# Patient Record
Sex: FEMALE | Race: OTHER | NOT HISPANIC OR LATINO | URBAN - METROPOLITAN AREA
[De-identification: names, ages, dates, MRNs, and addresses within clinical notes are randomized per-mention and may not be internally consistent; named-entity substitution may affect disease eponyms.]

---

## 2021-04-21 ENCOUNTER — EMERGENCY (EMERGENCY)
Facility: HOSPITAL | Age: 76
LOS: 1 days | Discharge: HOME | End: 2021-04-21
Attending: EMERGENCY MEDICINE
Payer: MEDICARE

## 2021-04-21 VITALS
SYSTOLIC BLOOD PRESSURE: 126 MMHG | TEMPERATURE: 98 F | OXYGEN SATURATION: 98 % | RESPIRATION RATE: 17 BRPM | HEART RATE: 85 BPM | DIASTOLIC BLOOD PRESSURE: 85 MMHG

## 2021-04-21 VITALS
SYSTOLIC BLOOD PRESSURE: 130 MMHG | HEART RATE: 103 BPM | DIASTOLIC BLOOD PRESSURE: 90 MMHG | OXYGEN SATURATION: 98 % | TEMPERATURE: 98 F | RESPIRATION RATE: 17 BRPM

## 2021-04-21 LAB
ALBUMIN SERPL ELPH-MCNC: 3.7 G/DL — SIGNIFICANT CHANGE UP (ref 3.5–5)
ALP SERPL-CCNC: 69 U/L — SIGNIFICANT CHANGE UP (ref 40–120)
ALT FLD-CCNC: 32 U/L DA — SIGNIFICANT CHANGE UP (ref 10–60)
ANION GAP SERPL CALC-SCNC: 5 MMOL/L — SIGNIFICANT CHANGE UP (ref 5–17)
APTT BLD: 28.5 SEC — SIGNIFICANT CHANGE UP (ref 27.5–35.5)
AST SERPL-CCNC: 24 U/L — SIGNIFICANT CHANGE UP (ref 10–40)
BASOPHILS # BLD AUTO: 0.06 K/UL — SIGNIFICANT CHANGE UP (ref 0–0.2)
BASOPHILS NFR BLD AUTO: 0.6 % — SIGNIFICANT CHANGE UP (ref 0–2)
BILIRUB SERPL-MCNC: 0.5 MG/DL — SIGNIFICANT CHANGE UP (ref 0.2–1.2)
BUN SERPL-MCNC: 19 MG/DL — HIGH (ref 7–18)
CALCIUM SERPL-MCNC: 9.4 MG/DL — SIGNIFICANT CHANGE UP (ref 8.4–10.5)
CHLORIDE SERPL-SCNC: 104 MMOL/L — SIGNIFICANT CHANGE UP (ref 96–108)
CK SERPL-CCNC: 52 U/L — SIGNIFICANT CHANGE UP (ref 21–215)
CO2 SERPL-SCNC: 26 MMOL/L — SIGNIFICANT CHANGE UP (ref 22–31)
CREAT SERPL-MCNC: 1.09 MG/DL — SIGNIFICANT CHANGE UP (ref 0.5–1.3)
D DIMER BLD IA.RAPID-MCNC: <150 NG/ML DDU — SIGNIFICANT CHANGE UP
EOSINOPHIL # BLD AUTO: 0.14 K/UL — SIGNIFICANT CHANGE UP (ref 0–0.5)
EOSINOPHIL NFR BLD AUTO: 1.4 % — SIGNIFICANT CHANGE UP (ref 0–6)
GLUCOSE SERPL-MCNC: 84 MG/DL — SIGNIFICANT CHANGE UP (ref 70–99)
HCT VFR BLD CALC: 38.6 % — SIGNIFICANT CHANGE UP (ref 34.5–45)
HGB BLD-MCNC: 14 G/DL — SIGNIFICANT CHANGE UP (ref 11.5–15.5)
IMM GRANULOCYTES NFR BLD AUTO: 0.4 % — SIGNIFICANT CHANGE UP (ref 0–1.5)
INR BLD: 1.06 RATIO — SIGNIFICANT CHANGE UP (ref 0.88–1.16)
LACTATE SERPL-SCNC: 2.1 MMOL/L — HIGH (ref 0.7–2)
LYMPHOCYTES # BLD AUTO: 2.63 K/UL — SIGNIFICANT CHANGE UP (ref 1–3.3)
LYMPHOCYTES # BLD AUTO: 25.5 % — SIGNIFICANT CHANGE UP (ref 13–44)
MCHC RBC-ENTMCNC: 30.5 PG — SIGNIFICANT CHANGE UP (ref 27–34)
MCHC RBC-ENTMCNC: 36.3 GM/DL — HIGH (ref 32–36)
MCV RBC AUTO: 84.1 FL — SIGNIFICANT CHANGE UP (ref 80–100)
MONOCYTES # BLD AUTO: 1.15 K/UL — HIGH (ref 0–0.9)
MONOCYTES NFR BLD AUTO: 11.2 % — SIGNIFICANT CHANGE UP (ref 2–14)
NEUTROPHILS # BLD AUTO: 6.29 K/UL — SIGNIFICANT CHANGE UP (ref 1.8–7.4)
NEUTROPHILS NFR BLD AUTO: 60.9 % — SIGNIFICANT CHANGE UP (ref 43–77)
NRBC # BLD: 0 /100 WBCS — SIGNIFICANT CHANGE UP (ref 0–0)
NT-PROBNP SERPL-SCNC: 131 PG/ML — SIGNIFICANT CHANGE UP (ref 0–450)
PLATELET # BLD AUTO: 243 K/UL — SIGNIFICANT CHANGE UP (ref 150–400)
POTASSIUM SERPL-MCNC: 3.6 MMOL/L — SIGNIFICANT CHANGE UP (ref 3.5–5.3)
POTASSIUM SERPL-SCNC: 3.6 MMOL/L — SIGNIFICANT CHANGE UP (ref 3.5–5.3)
PROT SERPL-MCNC: 7.3 G/DL — SIGNIFICANT CHANGE UP (ref 6–8.3)
PROTHROM AB SERPL-ACNC: 12.6 SEC — SIGNIFICANT CHANGE UP (ref 10.6–13.6)
RBC # BLD: 4.59 M/UL — SIGNIFICANT CHANGE UP (ref 3.8–5.2)
RBC # FLD: 13.3 % — SIGNIFICANT CHANGE UP (ref 10.3–14.5)
SARS-COV-2 RNA SPEC QL NAA+PROBE: SIGNIFICANT CHANGE UP
SODIUM SERPL-SCNC: 135 MMOL/L — SIGNIFICANT CHANGE UP (ref 135–145)
TROPONIN I SERPL-MCNC: <0.015 NG/ML — SIGNIFICANT CHANGE UP (ref 0–0.04)
WBC # BLD: 10.31 K/UL — SIGNIFICANT CHANGE UP (ref 3.8–10.5)
WBC # FLD AUTO: 10.31 K/UL — SIGNIFICANT CHANGE UP (ref 3.8–10.5)

## 2021-04-21 PROCEDURE — 87040 BLOOD CULTURE FOR BACTERIA: CPT

## 2021-04-21 PROCEDURE — 84145 PROCALCITONIN (PCT): CPT

## 2021-04-21 PROCEDURE — 85730 THROMBOPLASTIN TIME PARTIAL: CPT

## 2021-04-21 PROCEDURE — 85379 FIBRIN DEGRADATION QUANT: CPT

## 2021-04-21 PROCEDURE — 83880 ASSAY OF NATRIURETIC PEPTIDE: CPT

## 2021-04-21 PROCEDURE — 36415 COLL VENOUS BLD VENIPUNCTURE: CPT

## 2021-04-21 PROCEDURE — 93005 ELECTROCARDIOGRAM TRACING: CPT

## 2021-04-21 PROCEDURE — 82550 ASSAY OF CK (CPK): CPT

## 2021-04-21 PROCEDURE — 84484 ASSAY OF TROPONIN QUANT: CPT

## 2021-04-21 PROCEDURE — 85025 COMPLETE CBC W/AUTO DIFF WBC: CPT

## 2021-04-21 PROCEDURE — 99283 EMERGENCY DEPT VISIT LOW MDM: CPT

## 2021-04-21 PROCEDURE — 83605 ASSAY OF LACTIC ACID: CPT

## 2021-04-21 PROCEDURE — 82962 GLUCOSE BLOOD TEST: CPT

## 2021-04-21 PROCEDURE — 85610 PROTHROMBIN TIME: CPT

## 2021-04-21 PROCEDURE — 86140 C-REACTIVE PROTEIN: CPT

## 2021-04-21 PROCEDURE — 87635 SARS-COV-2 COVID-19 AMP PRB: CPT

## 2021-04-21 PROCEDURE — 82728 ASSAY OF FERRITIN: CPT

## 2021-04-21 PROCEDURE — 99285 EMERGENCY DEPT VISIT HI MDM: CPT | Mod: CS

## 2021-04-21 PROCEDURE — 80053 COMPREHEN METABOLIC PANEL: CPT

## 2021-04-21 NOTE — ED PROVIDER NOTE - OBJECTIVE STATEMENT
76 y/o F patient with unknown past medical history presents to the ED with c/o shortness of breath x4d and palpation yesterday and today. Patient denies any fever, or any other complaints. Patient states having mild chest discomfort for the past few days. Patient reports that she thinks that she was sent by Dr. Alaniz, but does not understand why. No smoking   PMD: Dr. Wilde

## 2021-04-21 NOTE — ED PROVIDER NOTE - CLINICAL SUMMARY MEDICAL DECISION MAKING FREE TEXT BOX
76 y/o F patient with palpitation, shortness of breath, and chest discomfort. Will attempt to reach Dr. Quintero, do labs, EKG, CXR and reassess.

## 2021-04-21 NOTE — ED PROVIDER NOTE - PROGRESS NOTE DETAILS
Pt doing well, VSS, ED workup with no significant abnormalities.  Lactate was initially sent with consideration for COVID, however COVID swab resulted negative.  Lactate 2.1 nonspecific, with no fever/leukocytosis, no signs of sepsis here.  Pt's daughter says that Dr. Quintero sent Pt due to slightly high WBC and mild tachycardia, however this is not evident on ED workup today.  I tried reaching Dr. Quintero multiple times and could not reach him by phone.  Pt has appointment to f/u with endocrinologist upcoming.  Advised strict return precautions and PMD f/u.

## 2021-04-21 NOTE — ED ADULT NURSE NOTE - OBJECTIVE STATEMENT
pt is a 76 y/o female with c/o  shortness of breath   and  chest pain , pt  with equal chest expansion no signs of any distress airway patent able to speak in full sentences. pt placed in hooked on a cardiac monitor for continous monitoring.

## 2021-04-21 NOTE — ED PROVIDER NOTE - NSFOLLOWUPINSTRUCTIONS_ED_ALL_ED_FT
EnglishSpanish                                                                                         Palpitations    Palpitations are feelings that your heartbeat is not normal. Your heartbeat may feel like it is:  •Uneven.      •Faster than normal.      •Fluttering.      •Skipping a beat.      This is usually not a serious problem. In some cases, you may need tests to rule out any serious problems.      Follow these instructions at home:    Pay attention to any changes in your condition. Take these actions to help manage your symptoms:    Eating and drinking   •Avoid:  •Coffee, tea, soft drinks, and energy drinks.      •Chocolate.      •Alcohol.      •Diet pills.          Lifestyle    •Try to lower your stress. These things can help you relax:  •Yoga.      •Deep breathing and meditation.      •Exercise.      •Using words and images to create positive thoughts (guided imagery).      •Using your mind to control things in your body (biofeedback).        • Do not use drugs.      •Get plenty of rest and sleep. Keep a regular bed time.        General instructions      •Take over-the-counter and prescription medicines only as told by your doctor.      • Do not use any products that contain nicotine or tobacco, such as cigarettes and e-cigarettes. If you need help quitting, ask your doctor.      •Keep all follow-up visits as told by your doctor. This is important. You may need more tests if palpitations do not go away or get worse.        Contact a doctor if:    •Your symptoms last more than 24 hours.      •Your symptoms occur more often.        Get help right away if you:    •Have chest pain.      •Feel short of breath.      •Have a very bad headache.      •Feel dizzy.      •Pass out (faint).        Summary    •Palpitations are feelings that your heartbeat is uneven or faster than normal. It may feel like your heart is fluttering or skipping a beat.      •Avoid food and drinks that may cause palpitations. These include caffeine, chocolate, and alcohol.      •Try to lower your stress. Do not smoke or use drugs.      •Get help right away if you faint or have chest pain, shortness of breath, a severe headache, or dizziness.      This information is not intended to replace advice given to you by your health care provider. Make sure you discuss any questions you have with your health care provider.      Document Revised: 01/30/2019 Document Reviewed: 01/30/2019    [employer] Patient Education © 2021 [employer]                    Log Out.      [employer]® CareNotes®     :  Health system  	                       DYSPNEA - AfterCare(R) Instructions(ER/ED)           Dyspnea    WHAT YOU NEED TO KNOW:    Dyspnea is breathing difficulty or discomfort. You may have labored, painful, or shallow breathing. You may feel breathless or short of breath. Dyspnea can occur during rest or with activity. You may have dyspnea for a short time, or it might become chronic. Dyspnea is often a symptom of a disease or condition.    DISCHARGE INSTRUCTIONS:    Return to the emergency department if:   •Your signs and symptoms are the same or worse within 24 hours of treatment.       •You have shaking chills or a fever over 102°F.       •You have new pain, pressure, or tightness in your chest.       •You have a new or worse cough or wheezing, or you cough up blood.      •You feel like you cannot get enough air.      •The skin over your ribs or on your neck sinks in when you breathe.       •You have a severe headache with vomiting and abdominal pain.       •You feel confused or dizzy.      Contact your healthcare provider or specialist if:   •You have questions or concerns about your condition or care.          Medicines:    •Medicines may be used to treat the cause of your dyspnea. Medicines may reduce swelling in your airway or decrease extra fluid from around your heart or lungs. Other medicines may be used to decrease anxiety and help you feel calm and relaxed.      •Take your medicine as directed. Contact your healthcare provider if you think your medicine is not helping or if you have side effects. Tell him or her if you are allergic to any medicine. Keep a list of the medicines, vitamins, and herbs you take. Include the amounts, and when and why you take them. Bring the list or the pill bottles to follow-up visits. Carry your medicine list with you in case of an emergency.      Manage long-term dyspnea:   •Create an action plan. You and your healthcare provider can work together to create a plan for how to handle episodes of dyspnea. The plan can include daily activities, treatment changes, and what to do if you have severe breathing problems.      •Lean forward on your elbows when you sit. This helps your lungs expand and may make it easier to breathe.      •Use pursed-lip breathing any time you feel short of breath. Breathe in through your nose and then slowly breathe out through your mouth with your lips slightly puckered. It should take you twice as long to breathe out as it did to breathe in.  Breathe in Breathe out           •Do not smoke. Nicotine and other chemicals in cigarettes and cigars can cause lung damage and make it harder to breathe. Ask your healthcare provider for information if you currently smoke and need help to quit. E-cigarettes or smokeless tobacco still contain nicotine. Talk to your healthcare provider before you use these products.       •Reach or maintain a healthy weight. Your healthcare provider can help you create a safe weight loss plan if you are overweight.      •Exercise as directed. Exercise can help your lungs work more easily. Exercise can also help you lose weight if needed. Try to get at least 30 minutes of exercise most days of the week. Your healthcare provider can help you create an exercise plan that is safe for you.      Follow up with your healthcare provider or specialist as directed: Write down your questions so you remember to ask them during your visits.       © Copyright [employer] 2021           back to top                          © Copyright [employer] 2021

## 2021-04-22 LAB
CRP SERPL-MCNC: <3 MG/L — SIGNIFICANT CHANGE UP
FERRITIN SERPL-MCNC: 422 NG/ML — HIGH (ref 15–150)
PROCALCITONIN SERPL-MCNC: 0.06 NG/ML — SIGNIFICANT CHANGE UP (ref 0.02–0.1)

## 2021-04-26 LAB
CULTURE RESULTS: SIGNIFICANT CHANGE UP
CULTURE RESULTS: SIGNIFICANT CHANGE UP
SPECIMEN SOURCE: SIGNIFICANT CHANGE UP
SPECIMEN SOURCE: SIGNIFICANT CHANGE UP

## 2021-12-16 ENCOUNTER — INPATIENT (INPATIENT)
Facility: HOSPITAL | Age: 76
LOS: 6 days | Discharge: HOME | DRG: 177 | End: 2021-12-23
Attending: HOSPITALIST | Admitting: HOSPITALIST
Payer: MEDICARE

## 2021-12-16 VITALS
OXYGEN SATURATION: 93 % | SYSTOLIC BLOOD PRESSURE: 143 MMHG | RESPIRATION RATE: 22 BRPM | DIASTOLIC BLOOD PRESSURE: 66 MMHG | HEART RATE: 88 BPM | WEIGHT: 139.99 LBS | HEIGHT: 62 IN

## 2021-12-16 LAB
ALBUMIN SERPL ELPH-MCNC: 2.2 G/DL — LOW (ref 3.5–5)
ALP SERPL-CCNC: 84 U/L — SIGNIFICANT CHANGE UP (ref 40–120)
ALT FLD-CCNC: 91 U/L DA — HIGH (ref 10–60)
ANION GAP SERPL CALC-SCNC: 8 MMOL/L — SIGNIFICANT CHANGE UP (ref 5–17)
AST SERPL-CCNC: 105 U/L — HIGH (ref 10–40)
BILIRUB SERPL-MCNC: 2 MG/DL — HIGH (ref 0.2–1.2)
BUN SERPL-MCNC: 23 MG/DL — HIGH (ref 7–18)
CALCIUM SERPL-MCNC: 8.5 MG/DL — SIGNIFICANT CHANGE UP (ref 8.4–10.5)
CHLORIDE SERPL-SCNC: 109 MMOL/L — HIGH (ref 96–108)
CO2 SERPL-SCNC: 21 MMOL/L — LOW (ref 22–31)
CREAT SERPL-MCNC: 0.91 MG/DL — SIGNIFICANT CHANGE UP (ref 0.5–1.3)
GLUCOSE SERPL-MCNC: 124 MG/DL — HIGH (ref 70–99)
POTASSIUM SERPL-MCNC: 3.8 MMOL/L — SIGNIFICANT CHANGE UP (ref 3.5–5.3)
POTASSIUM SERPL-SCNC: 3.8 MMOL/L — SIGNIFICANT CHANGE UP (ref 3.5–5.3)
PROT SERPL-MCNC: 6.9 G/DL — SIGNIFICANT CHANGE UP (ref 6–8.3)
SARS-COV-2 RNA SPEC QL NAA+PROBE: DETECTED
SODIUM SERPL-SCNC: 138 MMOL/L — SIGNIFICANT CHANGE UP (ref 135–145)
TROPONIN I, HIGH SENSITIVITY RESULT: 13.7 NG/L — SIGNIFICANT CHANGE UP

## 2021-12-16 PROCEDURE — 99285 EMERGENCY DEPT VISIT HI MDM: CPT | Mod: CS

## 2021-12-16 PROCEDURE — 71045 X-RAY EXAM CHEST 1 VIEW: CPT | Mod: 26

## 2021-12-16 PROCEDURE — 93010 ELECTROCARDIOGRAM REPORT: CPT

## 2021-12-16 RX ORDER — DEXAMETHASONE 0.5 MG/5ML
6 ELIXIR ORAL ONCE
Refills: 0 | Status: COMPLETED | OUTPATIENT
Start: 2021-12-16 | End: 2021-12-16

## 2021-12-16 RX ORDER — KETOROLAC TROMETHAMINE 30 MG/ML
30 SYRINGE (ML) INJECTION ONCE
Refills: 0 | Status: DISCONTINUED | OUTPATIENT
Start: 2021-12-16 | End: 2021-12-16

## 2021-12-16 RX ORDER — SODIUM CHLORIDE 9 MG/ML
1000 INJECTION INTRAMUSCULAR; INTRAVENOUS; SUBCUTANEOUS ONCE
Refills: 0 | Status: COMPLETED | OUTPATIENT
Start: 2021-12-16 | End: 2021-12-16

## 2021-12-16 RX ADMIN — Medication 30 MILLIGRAM(S): at 23:46

## 2021-12-16 RX ADMIN — SODIUM CHLORIDE 1000 MILLILITER(S): 9 INJECTION INTRAMUSCULAR; INTRAVENOUS; SUBCUTANEOUS at 23:45

## 2021-12-16 RX ADMIN — Medication 6 MILLIGRAM(S): at 23:46

## 2021-12-16 NOTE — ED ADULT TRIAGE NOTE - CHIEF COMPLAINT QUOTE
BIBA for sob, ems states sat was 66% on roomair, presently 93% on 8L by mask, diagnosed covid+ 1 week ago

## 2021-12-17 DIAGNOSIS — E78.5 HYPERLIPIDEMIA, UNSPECIFIED: ICD-10-CM

## 2021-12-17 DIAGNOSIS — R74.01 ELEVATION OF LEVELS OF LIVER TRANSAMINASE LEVELS: ICD-10-CM

## 2021-12-17 DIAGNOSIS — I10 ESSENTIAL (PRIMARY) HYPERTENSION: ICD-10-CM

## 2021-12-17 DIAGNOSIS — U07.1 COVID-19: ICD-10-CM

## 2021-12-17 DIAGNOSIS — Z29.9 ENCOUNTER FOR PROPHYLACTIC MEASURES, UNSPECIFIED: ICD-10-CM

## 2021-12-17 PROBLEM — Z78.9 OTHER SPECIFIED HEALTH STATUS: Chronic | Status: ACTIVE | Noted: 2021-04-21

## 2021-12-17 LAB
A1C WITH ESTIMATED AVERAGE GLUCOSE RESULT: 5.8 % — HIGH (ref 4–5.6)
ALBUMIN SERPL ELPH-MCNC: 2.1 G/DL — LOW (ref 3.5–5)
ALP SERPL-CCNC: 86 U/L — SIGNIFICANT CHANGE UP (ref 40–120)
ALT FLD-CCNC: 87 U/L DA — HIGH (ref 10–60)
ANION GAP SERPL CALC-SCNC: 7 MMOL/L — SIGNIFICANT CHANGE UP (ref 5–17)
APTT BLD: 22.8 SEC — LOW (ref 27.5–35.5)
AST SERPL-CCNC: 90 U/L — HIGH (ref 10–40)
BASOPHILS # BLD AUTO: 0.02 K/UL — SIGNIFICANT CHANGE UP (ref 0–0.2)
BASOPHILS # BLD AUTO: 0.03 K/UL — SIGNIFICANT CHANGE UP (ref 0–0.2)
BASOPHILS NFR BLD AUTO: 0.2 % — SIGNIFICANT CHANGE UP (ref 0–2)
BASOPHILS NFR BLD AUTO: 0.4 % — SIGNIFICANT CHANGE UP (ref 0–2)
BILIRUB DIRECT SERPL-MCNC: 0.7 MG/DL — HIGH (ref 0–0.3)
BILIRUB SERPL-MCNC: 1.7 MG/DL — HIGH (ref 0.2–1.2)
BUN SERPL-MCNC: 24 MG/DL — HIGH (ref 7–18)
CALCIUM SERPL-MCNC: 8.3 MG/DL — LOW (ref 8.4–10.5)
CHLORIDE SERPL-SCNC: 110 MMOL/L — HIGH (ref 96–108)
CK SERPL-CCNC: 40 U/L — SIGNIFICANT CHANGE UP (ref 21–215)
CO2 SERPL-SCNC: 23 MMOL/L — SIGNIFICANT CHANGE UP (ref 22–31)
COVID-19 SPIKE DOMAIN AB INTERP: POSITIVE
COVID-19 SPIKE DOMAIN ANTIBODY RESULT: 11.7 U/ML — HIGH
CREAT SERPL-MCNC: 0.92 MG/DL — SIGNIFICANT CHANGE UP (ref 0.5–1.3)
CRP SERPL-MCNC: 111 MG/L — HIGH
CRP SERPL-MCNC: 128 MG/L — HIGH
D DIMER BLD IA.RAPID-MCNC: 1411 NG/ML DDU — HIGH
D DIMER BLD IA.RAPID-MCNC: 1608 NG/ML DDU — HIGH
EOSINOPHIL # BLD AUTO: 0 K/UL — SIGNIFICANT CHANGE UP (ref 0–0.5)
EOSINOPHIL # BLD AUTO: 0 K/UL — SIGNIFICANT CHANGE UP (ref 0–0.5)
EOSINOPHIL NFR BLD AUTO: 0 % — SIGNIFICANT CHANGE UP (ref 0–6)
EOSINOPHIL NFR BLD AUTO: 0 % — SIGNIFICANT CHANGE UP (ref 0–6)
ESTIMATED AVERAGE GLUCOSE: 120 MG/DL — HIGH (ref 68–114)
FERRITIN SERPL-MCNC: 2340 NG/ML — HIGH (ref 15–150)
FERRITIN SERPL-MCNC: 2394 NG/ML — HIGH (ref 15–150)
GLUCOSE SERPL-MCNC: 178 MG/DL — HIGH (ref 70–99)
HCT VFR BLD CALC: 37.6 % — SIGNIFICANT CHANGE UP (ref 34.5–45)
HCT VFR BLD CALC: 38 % — SIGNIFICANT CHANGE UP (ref 34.5–45)
HGB BLD-MCNC: 12.6 G/DL — SIGNIFICANT CHANGE UP (ref 11.5–15.5)
HGB BLD-MCNC: 12.9 G/DL — SIGNIFICANT CHANGE UP (ref 11.5–15.5)
IMM GRANULOCYTES NFR BLD AUTO: 2 % — HIGH (ref 0–1.5)
IMM GRANULOCYTES NFR BLD AUTO: 3.2 % — HIGH (ref 0–1.5)
INR BLD: 1.28 RATIO — HIGH (ref 0.88–1.16)
LACTATE SERPL-SCNC: 1.6 MMOL/L — SIGNIFICANT CHANGE UP (ref 0.7–2)
LDH SERPL L TO P-CCNC: 550 U/L — HIGH (ref 120–225)
LYMPHOCYTES # BLD AUTO: 0.56 K/UL — LOW (ref 1–3.3)
LYMPHOCYTES # BLD AUTO: 0.57 K/UL — LOW (ref 1–3.3)
LYMPHOCYTES # BLD AUTO: 5.5 % — LOW (ref 13–44)
LYMPHOCYTES # BLD AUTO: 6.9 % — LOW (ref 13–44)
MAGNESIUM SERPL-MCNC: 2.5 MG/DL — SIGNIFICANT CHANGE UP (ref 1.6–2.6)
MANUAL SMEAR VERIFICATION: SIGNIFICANT CHANGE UP
MCHC RBC-ENTMCNC: 29.1 PG — SIGNIFICANT CHANGE UP (ref 27–34)
MCHC RBC-ENTMCNC: 29.2 PG — SIGNIFICANT CHANGE UP (ref 27–34)
MCHC RBC-ENTMCNC: 33.5 GM/DL — SIGNIFICANT CHANGE UP (ref 32–36)
MCHC RBC-ENTMCNC: 33.9 GM/DL — SIGNIFICANT CHANGE UP (ref 32–36)
MCV RBC AUTO: 86 FL — SIGNIFICANT CHANGE UP (ref 80–100)
MCV RBC AUTO: 86.8 FL — SIGNIFICANT CHANGE UP (ref 80–100)
METAMYELOCYTES # FLD: 1 % — HIGH (ref 0–0)
MONOCYTES # BLD AUTO: 0.42 K/UL — SIGNIFICANT CHANGE UP (ref 0–0.9)
MONOCYTES # BLD AUTO: 0.7 K/UL — SIGNIFICANT CHANGE UP (ref 0–0.9)
MONOCYTES NFR BLD AUTO: 5.2 % — SIGNIFICANT CHANGE UP (ref 2–14)
MONOCYTES NFR BLD AUTO: 6.7 % — SIGNIFICANT CHANGE UP (ref 2–14)
NEUTROPHILS # BLD AUTO: 6.86 K/UL — SIGNIFICANT CHANGE UP (ref 1.8–7.4)
NEUTROPHILS # BLD AUTO: 8.95 K/UL — HIGH (ref 1.8–7.4)
NEUTROPHILS NFR BLD AUTO: 84.3 % — HIGH (ref 43–77)
NEUTROPHILS NFR BLD AUTO: 85.6 % — HIGH (ref 43–77)
NRBC # BLD: 0 /100 WBCS — SIGNIFICANT CHANGE UP (ref 0–0)
NRBC # BLD: 0 /100 WBCS — SIGNIFICANT CHANGE UP (ref 0–0)
NRBC # BLD: 0 /100 — SIGNIFICANT CHANGE UP (ref 0–0)
NT-PROBNP SERPL-SCNC: 411 PG/ML — SIGNIFICANT CHANGE UP (ref 0–450)
OVALOCYTES BLD QL SMEAR: SLIGHT — SIGNIFICANT CHANGE UP
PHOSPHATE SERPL-MCNC: 3 MG/DL — SIGNIFICANT CHANGE UP (ref 2.5–4.5)
PLAT MORPH BLD: NORMAL — SIGNIFICANT CHANGE UP
PLATELET # BLD AUTO: 207 K/UL — SIGNIFICANT CHANGE UP (ref 150–400)
PLATELET # BLD AUTO: 216 K/UL — SIGNIFICANT CHANGE UP (ref 150–400)
POTASSIUM SERPL-MCNC: 4.2 MMOL/L — SIGNIFICANT CHANGE UP (ref 3.5–5.3)
POTASSIUM SERPL-SCNC: 4.2 MMOL/L — SIGNIFICANT CHANGE UP (ref 3.5–5.3)
PROCALCITONIN SERPL-MCNC: 0.1 NG/ML — SIGNIFICANT CHANGE UP (ref 0.02–0.1)
PROCALCITONIN SERPL-MCNC: 0.11 NG/ML — HIGH (ref 0.02–0.1)
PROT SERPL-MCNC: 6.4 G/DL — SIGNIFICANT CHANGE UP (ref 6–8.3)
PROTHROM AB SERPL-ACNC: 15.1 SEC — HIGH (ref 10.6–13.6)
RBC # BLD: 4.33 M/UL — SIGNIFICANT CHANGE UP (ref 3.8–5.2)
RBC # BLD: 4.42 M/UL — SIGNIFICANT CHANGE UP (ref 3.8–5.2)
RBC # FLD: 13.7 % — SIGNIFICANT CHANGE UP (ref 10.3–14.5)
RBC # FLD: 13.9 % — SIGNIFICANT CHANGE UP (ref 10.3–14.5)
RBC BLD AUTO: ABNORMAL
SARS-COV-2 IGG+IGM SERPL QL IA: 11.7 U/ML — HIGH
SARS-COV-2 IGG+IGM SERPL QL IA: POSITIVE
SODIUM SERPL-SCNC: 140 MMOL/L — SIGNIFICANT CHANGE UP (ref 135–145)
WBC # BLD: 10.45 K/UL — SIGNIFICANT CHANGE UP (ref 3.8–10.5)
WBC # BLD: 8.13 K/UL — SIGNIFICANT CHANGE UP (ref 3.8–10.5)
WBC # FLD AUTO: 10.45 K/UL — SIGNIFICANT CHANGE UP (ref 3.8–10.5)
WBC # FLD AUTO: 8.13 K/UL — SIGNIFICANT CHANGE UP (ref 3.8–10.5)

## 2021-12-17 PROCEDURE — 99223 1ST HOSP IP/OBS HIGH 75: CPT

## 2021-12-17 PROCEDURE — 12345: CPT | Mod: NC

## 2021-12-17 RX ORDER — ONDANSETRON 8 MG/1
4 TABLET, FILM COATED ORAL EVERY 6 HOURS
Refills: 0 | Status: DISCONTINUED | OUTPATIENT
Start: 2021-12-17 | End: 2021-12-23

## 2021-12-17 RX ORDER — ASPIRIN/CALCIUM CARB/MAGNESIUM 324 MG
81 TABLET ORAL DAILY
Refills: 0 | Status: DISCONTINUED | OUTPATIENT
Start: 2021-12-17 | End: 2021-12-23

## 2021-12-17 RX ORDER — TRAZODONE HCL 50 MG
1 TABLET ORAL
Qty: 0 | Refills: 0 | DISCHARGE

## 2021-12-17 RX ORDER — TRAZODONE HCL 50 MG
50 TABLET ORAL DAILY
Refills: 0 | Status: DISCONTINUED | OUTPATIENT
Start: 2021-12-17 | End: 2021-12-23

## 2021-12-17 RX ORDER — ENOXAPARIN SODIUM 100 MG/ML
40 INJECTION SUBCUTANEOUS DAILY
Refills: 0 | Status: DISCONTINUED | OUTPATIENT
Start: 2021-12-17 | End: 2021-12-17

## 2021-12-17 RX ORDER — ALPRAZOLAM 0.25 MG
0 TABLET ORAL
Qty: 0 | Refills: 0 | DISCHARGE

## 2021-12-17 RX ORDER — SODIUM CHLORIDE 9 MG/ML
1000 INJECTION INTRAMUSCULAR; INTRAVENOUS; SUBCUTANEOUS
Refills: 0 | Status: DISCONTINUED | OUTPATIENT
Start: 2021-12-17 | End: 2021-12-23

## 2021-12-17 RX ORDER — METOPROLOL TARTRATE 50 MG
12.5 TABLET ORAL
Refills: 0 | Status: DISCONTINUED | OUTPATIENT
Start: 2021-12-17 | End: 2021-12-23

## 2021-12-17 RX ORDER — ROSUVASTATIN CALCIUM 5 MG/1
1 TABLET ORAL
Qty: 0 | Refills: 0 | DISCHARGE

## 2021-12-17 RX ORDER — ATORVASTATIN CALCIUM 80 MG/1
80 TABLET, FILM COATED ORAL AT BEDTIME
Refills: 0 | Status: DISCONTINUED | OUTPATIENT
Start: 2021-12-17 | End: 2021-12-17

## 2021-12-17 RX ORDER — DONEPEZIL HYDROCHLORIDE 10 MG/1
5 TABLET, FILM COATED ORAL AT BEDTIME
Refills: 0 | Status: DISCONTINUED | OUTPATIENT
Start: 2021-12-17 | End: 2021-12-23

## 2021-12-17 RX ORDER — CHOLECALCIFEROL (VITAMIN D3) 125 MCG
1 CAPSULE ORAL
Qty: 0 | Refills: 0 | DISCHARGE

## 2021-12-17 RX ORDER — MEMANTINE HYDROCHLORIDE 10 MG/1
5 TABLET ORAL DAILY
Refills: 0 | Status: DISCONTINUED | OUTPATIENT
Start: 2021-12-17 | End: 2021-12-23

## 2021-12-17 RX ORDER — REMDESIVIR 5 MG/ML
200 INJECTION INTRAVENOUS EVERY 24 HOURS
Refills: 0 | Status: COMPLETED | OUTPATIENT
Start: 2021-12-17 | End: 2021-12-17

## 2021-12-17 RX ORDER — MEMANTINE HYDROCHLORIDE 10 MG/1
1 TABLET ORAL
Qty: 0 | Refills: 0 | DISCHARGE

## 2021-12-17 RX ORDER — DONEPEZIL HYDROCHLORIDE 10 MG/1
1 TABLET, FILM COATED ORAL
Qty: 0 | Refills: 0 | DISCHARGE

## 2021-12-17 RX ORDER — ACETAMINOPHEN 500 MG
650 TABLET ORAL EVERY 6 HOURS
Refills: 0 | Status: DISCONTINUED | OUTPATIENT
Start: 2021-12-17 | End: 2021-12-23

## 2021-12-17 RX ORDER — PANTOPRAZOLE SODIUM 20 MG/1
40 TABLET, DELAYED RELEASE ORAL
Refills: 0 | Status: DISCONTINUED | OUTPATIENT
Start: 2021-12-17 | End: 2021-12-23

## 2021-12-17 RX ORDER — REMDESIVIR 5 MG/ML
INJECTION INTRAVENOUS
Refills: 0 | Status: COMPLETED | OUTPATIENT
Start: 2021-12-17 | End: 2021-12-21

## 2021-12-17 RX ORDER — REMDESIVIR 5 MG/ML
100 INJECTION INTRAVENOUS EVERY 24 HOURS
Refills: 0 | Status: COMPLETED | OUTPATIENT
Start: 2021-12-18 | End: 2021-12-21

## 2021-12-17 RX ORDER — AMLODIPINE BESYLATE 2.5 MG/1
1 TABLET ORAL
Qty: 0 | Refills: 0 | DISCHARGE

## 2021-12-17 RX ORDER — OMEGA-3 ACID ETHYL ESTERS 1 G
1 CAPSULE ORAL
Qty: 0 | Refills: 0 | DISCHARGE

## 2021-12-17 RX ORDER — INFLUENZA VIRUS VACCINE 15; 15; 15; 15 UG/.5ML; UG/.5ML; UG/.5ML; UG/.5ML
0.7 SUSPENSION INTRAMUSCULAR ONCE
Refills: 0 | Status: DISCONTINUED | OUTPATIENT
Start: 2021-12-17 | End: 2021-12-23

## 2021-12-17 RX ORDER — AMLODIPINE BESYLATE 2.5 MG/1
2.5 TABLET ORAL DAILY
Refills: 0 | Status: DISCONTINUED | OUTPATIENT
Start: 2021-12-17 | End: 2021-12-23

## 2021-12-17 RX ORDER — DEXAMETHASONE 0.5 MG/5ML
6 ELIXIR ORAL DAILY
Refills: 0 | Status: DISCONTINUED | OUTPATIENT
Start: 2021-12-17 | End: 2021-12-23

## 2021-12-17 RX ORDER — ENOXAPARIN SODIUM 100 MG/ML
60 INJECTION SUBCUTANEOUS EVERY 12 HOURS
Refills: 0 | Status: DISCONTINUED | OUTPATIENT
Start: 2021-12-17 | End: 2021-12-23

## 2021-12-17 RX ADMIN — Medication 6 MILLIGRAM(S): at 05:55

## 2021-12-17 RX ADMIN — REMDESIVIR 500 MILLIGRAM(S): 5 INJECTION INTRAVENOUS at 08:00

## 2021-12-17 RX ADMIN — Medication 12.5 MILLIGRAM(S): at 18:14

## 2021-12-17 RX ADMIN — Medication 30 MILLIGRAM(S): at 03:40

## 2021-12-17 RX ADMIN — MEMANTINE HYDROCHLORIDE 5 MILLIGRAM(S): 10 TABLET ORAL at 13:45

## 2021-12-17 RX ADMIN — ENOXAPARIN SODIUM 60 MILLIGRAM(S): 100 INJECTION SUBCUTANEOUS at 18:14

## 2021-12-17 RX ADMIN — Medication 50 MILLIGRAM(S): at 13:45

## 2021-12-17 RX ADMIN — Medication 12.5 MILLIGRAM(S): at 05:54

## 2021-12-17 RX ADMIN — AMLODIPINE BESYLATE 2.5 MILLIGRAM(S): 2.5 TABLET ORAL at 05:55

## 2021-12-17 RX ADMIN — PANTOPRAZOLE SODIUM 40 MILLIGRAM(S): 20 TABLET, DELAYED RELEASE ORAL at 08:01

## 2021-12-17 RX ADMIN — DONEPEZIL HYDROCHLORIDE 5 MILLIGRAM(S): 10 TABLET, FILM COATED ORAL at 22:30

## 2021-12-17 RX ADMIN — Medication 81 MILLIGRAM(S): at 13:45

## 2021-12-17 RX ADMIN — SODIUM CHLORIDE 75 MILLILITER(S): 9 INJECTION INTRAMUSCULAR; INTRAVENOUS; SUBCUTANEOUS at 05:56

## 2021-12-17 NOTE — H&P ADULT - PROBLEM SELECTOR PLAN 4
LIVER FUNCTIONS - ( 16 Dec 2021 23:31 )  Alb: 2.2 g/dL / Pro: 6.9 g/dL / ALK PHOS: 84 U/L / ALT: 91 U/L DA / AST: 105 U/L / GGT: x         T bili 2  f/u direct and indirect bili  monitor lfts  likely due to covid  can consider RUQ if it persists.

## 2021-12-17 NOTE — H&P ADULT - ATTENDING COMMENTS
Patient is a 76 year old female with a PMH of HTN, COVID Infection     T(C): 36.8 (12-17-21 @ 00:33), Max: 36.8 (12-17-21 @ 00:33)  T(F): 98.3 (12-17-21 @ 00:33), Max: 98.3 (12-17-21 @ 00:33)  HR: 80 (12-17-21 @ 00:33) (80 - 88)  BP: 142/84 (12-17-21 @ 00:33) (142/84 - 143/66)  RR: 20 (12-17-21 @ 00:33) (20 - 22)  SpO2: 98% (12-17-21 @ 00:33) (93% - 98%)  Wt(kg): --    P/E: As above MAR    A/P:    Shortness of Breath due to Severe COVID Infection:  -COVID= Detected  -Chest film, though portable, appears to demonstrate diffuse bilateral patchy densities  -At time of examination in the ED, patient is requiring supplemental oxygen.  Therefore, patient can be categorized as Severe Disease.  -Will start patient on Lovenox  -In addition, will start patient on Dexamethasone 6mg IV Daily for 10 days or until discharge (whichever is shorter)  -Will also start patient on Albuterol MDI Q2H PRN  -Will send ESR, CRP, Procalcitonin  -Will continue to trend d-dimer, CRP, LDH, Troponin, Ferritin, CPK  -Tylenol PRN for fever  -Will continue to provide patient with any and all additional supportive care as necessary  -Will ask Infectious Disease to evaluate patient for further recommendations, such as Remdesivir    Uncontrolled Blood Glucose:  -Hemoglobin A1c with AM labs  -Blood Glucose Monitoring ACHS  -Regular Insulin Sliding Scale ACHS  -Carb Controlled, Heart Healthy, Low Sodium diet as tolerated    Hyperbilirubinemia:  -Will send Bilirubin, Total, Direct and Indirect  -Can consider obtaining Ultrasound of RUQ    Transaminitis:  -Will send Hepatitis Panel    HTN:  -Will resume patient's home medication  -Vital Signs Q4H    Hypoalbuminemia:  -Nutrition Consult    GI/DVT PPx:  -Lovenox  -Pepcid Patient is a 76 year old female with a PMH of HTN, HLD, COVID Infection dx 10 days ago who was BIBEMS due to shortness of breath.  Patient states that beginning nearly 2 weeks prior to current presentation she began to experience progressively worsening shortness of breath.  Patient states that she was diagnosed with COVID 10 days ago.    Of note, patient is unvaccinated for COVID.    T(C): 36.8 (12-17-21 @ 00:33), Max: 36.8 (12-17-21 @ 00:33)  T(F): 98.3 (12-17-21 @ 00:33), Max: 98.3 (12-17-21 @ 00:33)  HR: 80 (12-17-21 @ 00:33) (80 - 88)  BP: 142/84 (12-17-21 @ 00:33) (142/84 - 143/66)  RR: 20 (12-17-21 @ 00:33) (20 - 22)  SpO2: 98% (12-17-21 @ 00:33) (93% - 98%)  Wt(kg): --    P/E: As above MAR    A/P:    Shortness of Breath due to Severe COVID Infection:  -COVID= Detected  -Chest film, though portable, appears to demonstrate diffuse bilateral patchy densities  -At time of examination in the ED, patient is requiring supplemental oxygen.  Therefore, patient can be categorized as Severe Disease.  -Will start patient on Lovenox  -In addition, will start patient on Dexamethasone 6mg IV Daily for 10 days or until discharge (whichever is shorter)  -Will also start patient on Albuterol MDI Q2H PRN  -Will send ESR, CRP, Procalcitonin  -Will continue to trend d-dimer, CRP, LDH, Troponin, Ferritin, CPK  -Tylenol PRN for fever  -Will continue to provide patient with any and all additional supportive care as necessary  -Will ask Infectious Disease to evaluate patient for further recommendations, such as Remdesivir    Uncontrolled Blood Glucose:  -Hemoglobin A1c with AM labs  -Blood Glucose Monitoring ACHS  -Regular Insulin Sliding Scale ACHS  -Carb Controlled, Heart Healthy, Low Sodium diet as tolerated    Hyperbilirubinemia:  -Will send Bilirubin, Total, Direct and Indirect  -Can consider obtaining Ultrasound of RUQ    Transaminitis:  -Will send Hepatitis Panel    HTN:  -Will resume patient's home medication  -Vital Signs Q4H    Hypoalbuminemia:  -Nutrition Consult    GI/DVT PPx:  -Lovenox  -Pepcid

## 2021-12-17 NOTE — CHART NOTE - NSCHARTNOTEFT_GEN_A_CORE
EVENT: Paged by RN, pt required non-rebreather mask, as she desaturated to 67% on 6L NC    HPI:  77 y/o F with hx of HTN, HLD, Dementia? unvaccinated for covid, who presented to the ED with complains of weakness and hypoxia. Pt states that she developed sxs of covid 2 weeks ago and tested positive 10 days ago. Since then she has had progressively worsening fatigue, low appetite, fevers. She also endorsed on and off chest pain, and palpitations, currently denied any chest pain. She also endorses "diarrhea", described as watery bms once a day. Today she developed dizziness and nausea.  As per ED note EMS noted pt to be saturating at 60% on room air. Pt saturating at 98% on 6L.    Chest Xray showing extensive bl patchy infiltrates.  (17 Dec 2021 02:49)  Admitted to medicine for acute respiratory failure with hypoxia due to COVID 19 pneumonia   improving O2 saturation on 4L/min N-C, explained about COVID with complications and treatment course and discharge criteria     SUBJECTIVE: Pt seen and examined at bedside, appears in no acute distress. She remains on NRB mask, sPO2 96%. Noted with No IWOB or cyanosis.     OBJECTIVE:  Vital Signs Last 24 Hrs  T(C): 36.4 (17 Dec 2021 16:00), Max: 36.8 (17 Dec 2021 00:33)  T(F): 97.5 (17 Dec 2021 16:00), Max: 98.3 (17 Dec 2021 00:33)  HR: 62 (17 Dec 2021 17:55) (60 - 88)  BP: 141/67 (17 Dec 2021 17:55) (100/52 - 143/66)  BP(mean): --  RR: 19 (17 Dec 2021 17:55) (18 - 22)  SpO2: 92% (17 Dec 2021 17:55) (92% - 98%)    PHYSICAL EXAM:  Neuro: Awake and alert, oriented to person, place, and time  Cardiovascular: + S1, S2, no murmurs, rubs, or bruits  Respiratory: diminished breath sounds bilaterally, on 100% NRB mask  GI: Abdomen soft, non-tender, bowel sounds present   : Non distended;   Skin: warm and dry; no rash      LABS:                        12.6   8.13  )-----------( 207      ( 17 Dec 2021 05:41 )             37.6   CARDIAC MARKERS ( 17 Dec 2021 05:41 )  x     / x     / 40 U/L / x     / x        12-17    140  |  110<H>  |  24<H>  ----------------------------<  178<H>  4.2   |  23  |  0.92    Ca    8.3<L>      17 Dec 2021 05:41  Phos  3.0     12-17  Mg     2.5     12-17    TPro  6.4  /  Alb  2.1<L>  /  TBili  1.7<H>  /  DBili  0.7<H>  /  AST  90<H>  /  ALT  87<H>  /  AlkPhos  86  12-17        EKG:   IMAGING:    ASSESSMENT: Acute hypoxic respiratory failure with hypoxia secondary to COVID    PLAN:     -ICU consult, made aware of pt. As pt improved on NRB mask, no need for ICU at this time.  -C/w NRB mask  -Titrate O2 as tolerated, consider downgrading to optimizer pendant   -Continue current/supportive care    FOLLOW UP / RESULT:     -Monitor effectiveness of above intervention

## 2021-12-17 NOTE — DISCHARGE NOTE PROVIDER - ATTENDING DISCHARGE PHYSICAL EXAMINATION:
Patient seen and examined  No acute distress  lungs CTAB  heart RRR no m/g/r  Ext no c/c/e     Patient to take xarelto for 30 days. Dispo pending home oxygen delivery

## 2021-12-17 NOTE — PATIENT PROFILE ADULT - FALL HARM RISK - UNIVERSAL INTERVENTIONS
Bed in lowest position, wheels locked, appropriate side rails in place/Call bell, personal items and telephone in reach/Instruct patient to call for assistance before getting out of bed or chair/Non-slip footwear when patient is out of bed/[place] to call system/Physically safe environment - no spills, clutter or unnecessary equipment/Purposeful Proactive Rounding/Room/bathroom lighting operational, light cord in reach

## 2021-12-17 NOTE — DISCHARGE NOTE PROVIDER - NSDCMRMEDTOKEN_GEN_ALL_CORE_FT
amLODIPine 2.5 mg oral tablet: 1 tab(s) orally once a day  Aricept 5 mg oral tablet: 1 tab(s) orally once a day (at bedtime)  aspirin 81 mg oral tablet, chewable: 1 tab(s) orally once a day  cholecalciferol 1250 mcg (50,000 intl units) oral capsule: 1 cap(s) orally once a week  Crestor 20 mg oral tablet: 1 tab(s) orally once a day  Dexilant 60 mg oral delayed release capsule: 1 cap(s) orally once a day  Fish Oil 1000 mg oral capsule: 1 cap(s) orally once a day  icosapent 1 g oral capsule: 2 cap(s) orally 2 times a day  metoprolol succinate 50 mg oral tablet, extended release: 1 tab(s) orally once a day  Namenda 5 mg oral tablet: 1 tab(s) orally once a day  predniSONE 10 mg oral tablet: 1 tab(s) orally once a day  traZODone 50 mg oral tablet: 1 tab(s) orally once a day   acetaminophen 325 mg oral tablet: 2 tab(s) orally every 6 hours, As needed, Temp greater or equal to 38C (100.4F), Mild Pain (1 - 3), Moderate Pain (4 - 6)  aluminum hydroxide-magnesium hydroxide 200 mg-200 mg/5 mL oral suspension: 30 milliliter(s) orally every 4 hours, As needed, Dyspepsia  amLODIPine 2.5 mg oral tablet: 1 tab(s) orally once a day  Aricept 5 mg oral tablet: 1 tab(s) orally once a day (at bedtime)  cholecalciferol 1250 mcg (50,000 intl units) oral capsule: 1 cap(s) orally once a week  Crestor 20 mg oral tablet: 1 tab(s) orally once a day  dexamethasone 6 mg oral tablet: 1 tab(s) orally once a day  Dexilant 60 mg oral delayed release capsule: 1 cap(s) orally once a day  Fish Oil 1000 mg oral capsule: 1 cap(s) orally once a day  guaiFENesin 100 mg/5 mL oral liquid: 10 milliliter(s) orally every 6 hours, As needed, Cough  icosapent 1 g oral capsule: 2 cap(s) orally 2 times a day  memantine 5 mg oral tablet: 1 tab(s) orally once a day  metoprolol succinate 50 mg oral tablet, extended release: 1 tab(s) orally once a day  OLANZapine 2.5 mg oral tablet: 1 tab(s) orally once a day (at bedtime)  traZODone 50 mg oral tablet: 1 tab(s) orally once a day   acetaminophen 325 mg oral tablet: 2 tab(s) orally every 6 hours, As needed, Temp greater or equal to 38C (100.4F), Mild Pain (1 - 3), Moderate Pain (4 - 6)  aluminum hydroxide-magnesium hydroxide 200 mg-200 mg/5 mL oral suspension: 30 milliliter(s) orally every 4 hours, As needed, Dyspepsia  amLODIPine 2.5 mg oral tablet: 1 tab(s) orally once a day  Aricept 5 mg oral tablet: 1 tab(s) orally once a day (at bedtime)  cholecalciferol 1250 mcg (50,000 intl units) oral capsule: 1 cap(s) orally once a week  Crestor 20 mg oral tablet: 1 tab(s) orally once a day  dexamethasone 6 mg oral tablet: 1 tab(s) orally once a day  Dexilant 60 mg oral delayed release capsule: 1 cap(s) orally once a day  Fish Oil 1000 mg oral capsule: 1 cap(s) orally once a day  guaiFENesin 100 mg/5 mL oral liquid: 10 milliliter(s) orally every 6 hours, As needed, Cough  icosapent 1 g oral capsule: 2 cap(s) orally 2 times a day  memantine 5 mg oral tablet: 1 tab(s) orally once a day  metoprolol succinate 50 mg oral tablet, extended release: 1 tab(s) orally once a day  OLANZapine 2.5 mg oral tablet: 1 tab(s) orally once a day (at bedtime)  traZODone 50 mg oral tablet: 1 tab(s) orally once a day  Xarelto 10 mg oral tablet: 1 tab(s) orally once a day

## 2021-12-17 NOTE — H&P ADULT - HISTORY OF PRESENT ILLNESS
Pt is a 77 y/o F with hx of HTN, HLD, Dementia? unvaccinated for covid, who presented to the ED with complains of weakness and hypoxia. Pt states that she developed sxs of covid 2 weeks ago and tested positive 10 days ago. Since then she has had progressively worsening fatigue, low appetite, fevers. She also endorsed on and off chest pain, and palpitations, currently denied any chest pain. She also endorses "diarrhea", described as watery bms once a day. Today she developed dizziness and nausea.  As per ED note EMS noted pt to be saturating at 60% on room air. Pt saturating at 98% on 6L.    Chest Xray showing extensive bl patchy infiltrates.

## 2021-12-17 NOTE — DISCHARGE NOTE PROVIDER - NSDCCPCAREPLAN_GEN_ALL_CORE_FT
PRINCIPAL DISCHARGE DIAGNOSIS  Diagnosis: Acute respiratory failure due to COVID-19  Assessment and Plan of Treatment:       SECONDARY DISCHARGE DIAGNOSES  Diagnosis: Transaminitis  Assessment and Plan of Treatment:     Diagnosis: DVT prophylaxis  Assessment and Plan of Treatment:      PRINCIPAL DISCHARGE DIAGNOSIS  Diagnosis: Acute respiratory failure due to COVID-19  Assessment and Plan of Treatment: You were found to have pneumonia due to Covid 19. You required oxygen. You were treated with remdesivir and decadron and requried supplemental oxygen.  Please maintain quarentine for 14 days. Seek immediate medical attention if you develop shortness of breath. See additional instructions below.  CORONAVIRUS INSTRUCTIONS:   Based on your current clinical status and stability, it has been determined that you no longer need hospitalization and can recover while remaining in self-quarantine at home. You should follow the prevention steps below until a healthcare provider or local or state health department says you can return to your normal activities.   1. You should restrict activities outside your home, except for getting medical care.   2. Do not go to work, school, or public areas.   3. Avoid using public transportation, ride-sharing, or taxis.   4. Separate yourself from other people and animals in your home as much as possible.  When you are around other people (e.g., sharing a room or vehicle) you should wear a facemask.  5. Wash your hands often with soap and water for at least 20 seconds, especially after blowing your nose, coughing, or sneezing; going to the bathroom; and before eating or preparing food.  6. Cover your mouth and nose with a tissue when you cough or sneeze. Throw used tissues in a lined trash can. Immediately wash your hands with soap and water for at least 20 seconds  7. High touch surfaces include counters, tabletops, doorknobs, bathroom fixtures, toilets, phones, keyboards, tablets, and bedside tables.  8. Avoid sharing dishes, drinking glasses, cups, eating utensils, towels, or bedding with other people or pets in your home. After using these items, they should be washed thoroughly with soap and water.  You are strongly advised to seek prompt medical attention if your illness worsens or you develop new symptoms like fever or difficulty breathing.      SECONDARY DISCHARGE DIAGNOSES  Diagnosis: Dementia  Assessment and Plan of Treatment: Dementia care: Create a safe environment. Remove locks on bathroom doors. Cover electrical outlets, use childproof latched on cabinets. Install childproof devices to keep doors and windows secured. Childproof stoves , ovens, medications, water temperature on water heaters. Secure knives, lighters, matches, power tools, and guns. Falls precautions, free from clutter, remove throw rugs. Insure adequate lighting. Install grab rails as needed. Obtain life line, use baby monitors. Provide 24hr /7 day per week supervision Reduce confusion. Keep familiar objects and people around. Use night lights or dim lights at night. Use reminders, notes, or directions for daily activities or tasks. Keep a simple, consistent routine for waking, meals, bathing, dressing, and bedtime. Create a calm, quiet environment. Place large clocks and calendars prominently.  Display emergency numbers and home address near all telephones. Ensure a regular walking or physical activity schedule. Involve the person in daily activities as much as possible. Limit napping during the day.  Limit caffeine. Attend social events that stimulate rather than overwhelm the senses. Encourage good nutrition and hydration. Reduce distractions during meal times and snacks. Monitor chewing and swallowing ability. Continue with routine vision, hearing, dental, and medical screenings. All medications per MD only. If change in behavior or patient becomes more confused or letharic seek medical attention.    Any new problem with brain function happens. This includes problems with balance, speech, or falling a lot.   New or worsened confusion develops. New or worsened sleepiness develops. Staying awake becomes hard to do. This could indicate an infection if fever develops.      Diagnosis: HTN (hypertension)  Assessment and Plan of Treatment: Continue a Low salt diet  Activity as tolerated.  Take all medication as prescribed.  Follow up with your medical doctor for routine blood pressure monitoring at your next visit.  Notify your doctor if you have any of the following symptoms:   Dizziness, Lightheadedness, Blurry vision, Headache, Chest pain, Shortness of breath      Diagnosis: Transaminitis  Assessment and Plan of Treatment: You were found to have elevated liver enzymes, this is common with COVID 19. It is important to stay hydrated and avoid hepatotoxic medications like tylenol. For fevers take Motrin instead. Follow up with your primary care doctor in 1 week to have your liver function tests rechecked     PRINCIPAL DISCHARGE DIAGNOSIS  Diagnosis: Acute respiratory failure due to COVID-19  Assessment and Plan of Treatment: You were found to have pneumonia due to Covid 19. You required oxygen. You were treated with remdesivir and decadron and requried supplemental oxygen.  Please maintain quarentine for 14 days. Seek immediate medical attention if you develop shortness of breath. See additional instructions below.  CORONAVIRUS INSTRUCTIONS:   Based on your current clinical status and stability, it has been determined that you no longer need hospitalization and can recover while remaining in self-quarantine at home. You should follow the prevention steps below until a healthcare provider or local or state health department says you can return to your normal activities.   1. You should restrict activities outside your home, except for getting medical care.   2. Do not go to work, school, or public areas.   3. Avoid using public transportation, ride-sharing, or taxis.   4. Separate yourself from other people and animals in your home as much as possible.  When you are around other people (e.g., sharing a room or vehicle) you should wear a facemask.  5. Wash your hands often with soap and water for at least 20 seconds, especially after blowing your nose, coughing, or sneezing; going to the bathroom; and before eating or preparing food.  6. Cover your mouth and nose with a tissue when you cough or sneeze. Throw used tissues in a lined trash can. Immediately wash your hands with soap and water for at least 20 seconds  7. High touch surfaces include counters, tabletops, doorknobs, bathroom fixtures, toilets, phones, keyboards, tablets, and bedside tables.  8. Avoid sharing dishes, drinking glasses, cups, eating utensils, towels, or bedding with other people or pets in your home. After using these items, they should be washed thoroughly with soap and water.  You are strongly advised to seek prompt medical attention if your illness worsens or you develop new symptoms like fever or difficulty breathing.      SECONDARY DISCHARGE DIAGNOSES  Diagnosis: Transaminitis  Assessment and Plan of Treatment: You were found to have elevated liver enzymes, this is common with COVID 19. It is important to stay hydrated and avoid hepatotoxic medications like tylenol. For fevers take Motrin instead. Follow up with your primary care doctor in 1 week to have your liver function tests rechecked    Diagnosis: Dementia  Assessment and Plan of Treatment: Dementia care: Create a safe environment. Remove locks on bathroom doors. Cover electrical outlets, use childproof latched on cabinets. Install childproof devices to keep doors and windows secured. Childproof stoves , ovens, medications, water temperature on water heaters. Secure knives, lighters, matches, power tools, and guns. Falls precautions, free from clutter, remove throw rugs. Insure adequate lighting. Install grab rails as needed. Obtain life line, use baby monitors. Provide 24hr /7 day per week supervision Reduce confusion. Keep familiar objects and people around. Use night lights or dim lights at night. Use reminders, notes, or directions for daily activities or tasks. Keep a simple, consistent routine for waking, meals, bathing, dressing, and bedtime. Create a calm, quiet environment. Place large clocks and calendars prominently.  Display emergency numbers and home address near all telephones. Ensure a regular walking or physical activity schedule. Involve the person in daily activities as much as possible. Limit napping during the day.  Limit caffeine. Attend social events that stimulate rather than overwhelm the senses. Encourage good nutrition and hydration. Reduce distractions during meal times and snacks. Monitor chewing and swallowing ability. Continue with routine vision, hearing, dental, and medical screenings. All medications per MD only. If change in behavior or patient becomes more confused or letharic seek medical attention.    Any new problem with brain function happens. This includes problems with balance, speech, or falling a lot.   New or worsened confusion develops. New or worsened sleepiness develops. Staying awake becomes hard to do. This could indicate an infection if fever develops.      Diagnosis: HTN (hypertension)  Assessment and Plan of Treatment: Continue a Low salt diet  Activity as tolerated.  Take all medication as prescribed.  Follow up with your medical doctor for routine blood pressure monitoring at your next visit.  Notify your doctor if you have any of the following symptoms:   Dizziness, Lightheadedness, Blurry vision, Headache, Chest pain, Shortness of breath       PRINCIPAL DISCHARGE DIAGNOSIS  Diagnosis: Acute respiratory failure due to COVID-19  Assessment and Plan of Treatment: You were found to have pneumonia due to Covid 19. You required oxygen. You were treated with remdesivir and decadron and requried supplemental oxygen.  Please maintain quarentine for total of  14 days. Seek immediate medical attention if you develop shortness of breath. See additional instructions below.  CORONAVIRUS INSTRUCTIONS:   Based on your current clinical status and stability, it has been determined that you no longer need hospitalization and can recover while remaining in self-quarantine at home. You should follow the prevention steps below until a healthcare provider or local or state health department says you can return to your normal activities.   1. You should restrict activities outside your home, except for getting medical care.   2. Do not go to work, school, or public areas.   3. Avoid using public transportation, ride-sharing, or taxis.   4. Separate yourself from other people and animals in your home as much as possible.  When you are around other people (e.g., sharing a room or vehicle) you should wear a facemask.  5. Wash your hands often with soap and water for at least 20 seconds, especially after blowing your nose, coughing, or sneezing; going to the bathroom; and before eating or preparing food.  6. Cover your mouth and nose with a tissue when you cough or sneeze. Throw used tissues in a lined trash can. Immediately wash your hands with soap and water for at least 20 seconds  7. High touch surfaces include counters, tabletops, doorknobs, bathroom fixtures, toilets, phones, keyboards, tablets, and bedside tables.  8. Avoid sharing dishes, drinking glasses, cups, eating utensils, towels, or bedding with other people or pets in your home. After using these items, they should be washed thoroughly with soap and water.  You are strongly advised to seek prompt medical attention if your illness worsens or you develop new symptoms like fever or difficulty breathing.      SECONDARY DISCHARGE DIAGNOSES  Diagnosis: Transaminitis  Assessment and Plan of Treatment: You were found to have elevated liver enzymes, this is common with COVID 19. It is important to stay hydrated and avoid hepatotoxic medications like tylenol. For fevers take Motrin instead. Follow up with your primary care doctor in 1 week to have your liver function tests rechecked    Diagnosis: Dementia  Assessment and Plan of Treatment: continue medications as prescribed   Follow up with PCP within 1 week    Diagnosis: HTN (hypertension)  Assessment and Plan of Treatment: Continue a Low salt diet  Activity as tolerated.  Take all medication as prescribed.  Follow up with your medical doctor for routine blood pressure monitoring at your next visit.  Notify your doctor if you have any of the following symptoms:   Dizziness, Lightheadedness, Blurry vision, Headache, Chest pain, Shortness of breath

## 2021-12-17 NOTE — ED PROVIDER NOTE - OBJECTIVE STATEMENT
76 year old female PMh HTN coming in with SOb and COVID+. states she had symptoms onset 2 weeks ago and tested positive and symptoms have bene progressively been getting worse. states some body aches. as per EMS O2 on RA in 60s.

## 2021-12-17 NOTE — CHART NOTE - NSCHARTNOTEFT_GEN_A_CORE
EVENT: admitted to medicine for acute respiratory failure with hypoxia 2/2 COVID    SUBJECTIVE: I wanted to go home early possible     OBJECTIVE: COVID with hypoxia, required remdesivir for 5 days course as pt is unvaccinated, also requiring supplemental oxygen  with decadron     Vital Signs Last 24 Hrs   T(C): 36.6 (17 Dec 2021 07:29), Max: 36.8 (17 Dec 2021 00:33)  T(F): 97.8 (17 Dec 2021 07:29), Max: 98.3 (17 Dec 2021 00:33)  HR: 64 (17 Dec 2021 07:29) (64 - 88)  BP: 100/52 (17 Dec 2021 07:29) (100/52 - 143/66)  BP(mean): --  RR: 19 (17 Dec 2021 07:29) (19 - 22)  SpO2: 97% (17 Dec 2021 07:29) (93% - 98%)    LABS:                        12.6   8.13  )-----------( 207      ( 17 Dec 2021 05:41 )             37.6   CARDIAC MARKERS ( 17 Dec 2021 05:41 )  x     / x     / 40 U/L / x     / x        12-17    140  |  110<H>  |  24<H>  ----------------------------<  178<H>  4.2   |  23  |  0.92    Ca    8.3<L>      17 Dec 2021 05:41  Phos  3.0     12-17  Mg     2.5     12-17    TPro  6.4  /  Alb  2.1<L>  /  TBili  1.7<H>  /  DBili  0.7<H>  /  AST  90<H>  /  ALT  87<H>  /  AlkPhos  86  12-17      EKG:   IMGAGING:  < from: Xray Chest 1 View-PORTABLE IMMEDIATE (12.16.21 @ 23:40) >      ACC: [id number] EXAM:  XR CHEST PORTABLE IMMED 1V                          PROCEDURE DATE:  12/16/2021          INTERPRETATION:  Clinical history: 76-year-old female, shortness of   breath, cough and fever.    Portable view of the chest without comparison demonstrates a normal   cardiac silhouette and normal pulmonary vasculature with no pneumothorax   or acute osseous finding.    Extensive bilateral alveolar infiltrates.    IMPRESSION:  Extensive bilateral alveolar infiltrates, Covid 19 should be excluded    --- End of Report ---            DARIA BOYLEITS DO; Attending Radiologist  This document has been electronically signed. Dec 17 2021  7:49AM      ASSESSMENT:  AO x 4  CV S1S2 RRR  Lungs -diminished breaths sound, no wheezing, occasional cough     HPI:  Pt is a 77 y/o F with hx of HTN, HLD, Dementia? unvaccinated for covid, who presented to the ED with complains of weakness and hypoxia. Pt states that she developed sxs of covid 2 weeks ago and tested positive 10 days ago. Since then she has had progressively worsening fatigue, low appetite, fevers. She also endorsed on and off chest pain, and palpitations, currently denied any chest pain. She also endorses "diarrhea", described as watery bms once a day. Today she developed dizziness and nausea.  As per ED note EMS noted pt to be saturating at 60% on room air. Pt saturating at 98% on 6L.    Chest Xray showing extensive bl patchy infiltrates.  (17 Dec 2021 02:49)  admitted to medicine for acute respiratory failure with hypoxia due to COVID 19 pneumonia   improving O2 saturation on 4L/min N-C, explained about COVID with complications and treatment course and discharge criteria     PLAN:   1. acute respiratory failure with hypoxia due to COVID 19 pneumonia - Remdesivir D2, monitor LFTs , likely due to COVID pt presented with Transiminits which improving  now, f/u COVID AB, continue decadron and supplements, isolation, titrate down Oxygen as tolerated    2. Transaminitis - plan as above   3. elevated D-dimer 2/2 COVID - continue full dose of lovenox, pt might need xaralto 10mg QD x 30 days on discharge, pt and daughter Chiquita made aware   4. meds rec done with her pharm  examined and interview with [facility]  ID [id number]     discussed plan as above with Dr. Rizzo.

## 2021-12-17 NOTE — H&P ADULT - NSHPPHYSICALEXAM_GEN_ALL_CORE
GENERAL: Sweaty, NAD, NC in place, tired appearing   HEAD:  Atraumatic, Normocephalic  EYES: EOMI, PERRLA, conjunctiva and sclera clear  ENMT:  very dry mucous membranes, No lesions  NECK: Supple, normal appearance, No JVD; Normal thyroid; Trachea midline  NERVOUS SYSTEM:  Alert & Oriented X2,  Motor Strength 5/5 B/L upper and lower extremities, sensation intact  CHEST/LUNG: BL rales  HEART: Regular rate and rhythm; No murmurs, rubs, or gallops  ABDOMEN: Soft, mildly tender, Nondistended; Bowel sounds present  EXTREMITIES:  2+ Peripheral Pulses, No clubbing, cyanosis, or edema  LYMPH: No lymphadenopathy noted  SKIN: No rashes or lesions;  Good capillary refill

## 2021-12-17 NOTE — H&P ADULT - NSHPREVIEWOFSYSTEMS_GEN_ALL_CORE
CONSTITUTIONAL: + fever,+ fatigue, + decreased appetite  EYES: No eye pain, visual disturbances, or discharge  ENT:  No difficulty hearing, tinnitus, vertigo; No sinus or throat pain  NECK: No pain or stiffness  RESPIRATORY: + cough, + SOB. No wheezing, chills or hemoptysis;   CARDIOVASCULAR: + chest pain and palpitations, no passing out, dizziness, or leg swelling  GASTROINTESTINAL: + generalized abdominal discomfort, + watery stools, + nausea, No vomiting, or hematemesis; No diarrhea or constipation. No melena or hematochezia.  GENITOURINARY: No dysuria, frequency, hematuria, or incontinence  NEUROLOGICAL: No headaches, memory loss, loss of strength, numbness, or tremors  SKIN: No itching, burning, rashes, or lesions   LYMPH Nodes: No enlarged glands  ENDOCRINE: No heat or cold intolerance; No hair loss  MUSCULOSKELETAL: No joint pain or swelling; No muscle, back, No extremity pain  PSYCHIATRIC: No depression, anxiety, mood swings, or difficulty sleeping  HEME/LYMPH: No easy bruising, or bleeding gums  ALLERGY AND IMMUNOLOGIC: No hives or eczema

## 2021-12-17 NOTE — H&P ADULT - ASSESSMENT
Pt is a 77 y/o F with hx of HTN, HLD, Dementia? who presented to the ED with complains of weakness and hypoxia. Admitted for COVID 19.    PRIMARY TEAM TO CONFIRM HOME MEDS, LIMITED MED REC FROM [facility]

## 2021-12-17 NOTE — H&P ADULT - NSICDXPASTMEDICALHX_GEN_ALL_CORE_FT
PAST MEDICAL HISTORY:  HLD (hyperlipidemia)     HTN (hypertension)     No pertinent past medical history

## 2021-12-17 NOTE — DISCHARGE NOTE PROVIDER - CARE PROVIDER_API CALL
LIZY GUERRA  Internal Medicine  [address] [facility] [address] [address]  Phone: [phone]  Fax: [phone]  Follow Up Time: 2 weeks

## 2021-12-17 NOTE — PATIENT PROFILE ADULT - CAREGIVER NAME
hazel

## 2021-12-17 NOTE — H&P ADULT - PROBLEM SELECTOR PLAN 1
Pt with AHRF 2/2 covid 19  sat 60% on room air as per ems  unvaccinated, says tested positive 10 days ago  chest xray with bl patchy infiltrates  On 6L nc, 98%  will start decadron and remdesivir  monitor lfts while on remdesivir, dc if > 10x ULN  IV fluids  albuterol, tylenol, cough medicine prn  F/u covid labs  lovenox for dvt ppx

## 2021-12-17 NOTE — DISCHARGE NOTE PROVIDER - DETAILS OF MALNUTRITION DIAGNOSIS/DIAGNOSES
This patient has been assessed with a concern for Malnutrition and was treated during this hospitalization for the following Nutrition diagnosis/diagnoses:     -  12/23/2021: Moderate protein-calorie malnutrition

## 2021-12-17 NOTE — H&P ADULT - PROBLEM SELECTOR PLAN 2
pmh of htn unclear what is home meds  confirm home meds in am  will start amlodipine and low dose metoprolol for now as per sure scripts  monitor bp  adjust meds as needed

## 2021-12-17 NOTE — DISCHARGE NOTE PROVIDER - HOSPITAL COURSE
Pt is a 75 y/o F with hx of HTN, HLD, Dementia,  unvaccinated for COVID, admitted to medicine for acute respiratory failure with hypoxia due to COVID 19 per CXR. Treated with remdesivir and decadron, Oxygen saturation improving with supplemental oxygen.  Transaminitis possible due to COVID which improving also, advised to have f/u LFTs as outpt.  Also, elevated D-dimer 2/2 COVID - started on  full dose of lovenox, d/willam home on  xaralto 10mg QD x 30 days on discharge.    Incomplete 12/17    Pt is a 77 y/o F from home with hx of HTN, HLD, and Dementia who presented to the ED with complaints of weakness and hypoxia. Found COVID +, CXr confirmed significant Extensive bilateral alveolar infiltrates. Admitted to medicine for acute hypoxic respiratory failure due to COVID PNA. Requiring supplemental 02, started on remdesivir and decadron D4, Carine Christianson following. Elevated dimer on admission and started on full dose lovenox/ Hospital course complicated by worsening dementia and agitation, started on Zyprexa at bedtime. Pending PT eval for d/c planning, weaning off 02 as tolerated- Eval for need for home 02------  Eval for need for prolonger DVT PPX  -*-*-* INCOMPLETE 12/21 75 y/o F from home with hx of HTN, HLD, and Dementia who presented to the ED with complaints of weakness and hypoxia. Found COVID +, CXr confirmed significant Extensive bilateral alveolar infiltrates. Admitted to medicine for acute hypoxic respiratory failure due to COVID PNA. Requiring supplemental 02, started on remdesivir and decadron. Pulm Dr. Christianson following.   Hospital course complicated by worsening dementia and agitation, started on Zyprexa at bedtime  Supplemental O2 titrated down to 2L, pt desating with ambulation, will require home O2     Incomplete 12/22 75 y/o F from home with hx of HTN, HLD, and Dementia who presented to the ED with complaints of weakness and hypoxia. Found COVID +, CXr confirmed significant Extensive bilateral alveolar infiltrates. Admitted to medicine for acute hypoxic respiratory failure due to COVID PNA. Requiring supplemental 02, started on remdesivir and decadron. Pulm Dr. Christianson following.   Hospital course complicated by worsening dementia and agitation, started on Zyprexa at bedtime  Supplemental O2 titrated down to 2L, pt desating with ambulation, will require home O2 which was set up by CM   Clinically improving, optimized for discharge with outpt follow up   Please note that this a brief summary of hospital course please refer to daily progress notes and consult notes for full course and events

## 2021-12-18 DIAGNOSIS — U07.1 COVID-19: ICD-10-CM

## 2021-12-18 LAB
ALBUMIN SERPL ELPH-MCNC: 2.1 G/DL — LOW (ref 3.5–5)
ALP SERPL-CCNC: 72 U/L — SIGNIFICANT CHANGE UP (ref 40–120)
ALT FLD-CCNC: 59 U/L DA — SIGNIFICANT CHANGE UP (ref 10–60)
ANION GAP SERPL CALC-SCNC: 6 MMOL/L — SIGNIFICANT CHANGE UP (ref 5–17)
AST SERPL-CCNC: 44 U/L — HIGH (ref 10–40)
BILIRUB SERPL-MCNC: 1.4 MG/DL — HIGH (ref 0.2–1.2)
BUN SERPL-MCNC: 29 MG/DL — HIGH (ref 7–18)
CALCIUM SERPL-MCNC: 8.7 MG/DL — SIGNIFICANT CHANGE UP (ref 8.4–10.5)
CHLORIDE SERPL-SCNC: 112 MMOL/L — HIGH (ref 96–108)
CO2 SERPL-SCNC: 25 MMOL/L — SIGNIFICANT CHANGE UP (ref 22–31)
CREAT SERPL-MCNC: 0.88 MG/DL — SIGNIFICANT CHANGE UP (ref 0.5–1.3)
GLUCOSE SERPL-MCNC: 128 MG/DL — HIGH (ref 70–99)
HCT VFR BLD CALC: 38 % — SIGNIFICANT CHANGE UP (ref 34.5–45)
HGB BLD-MCNC: 12.9 G/DL — SIGNIFICANT CHANGE UP (ref 11.5–15.5)
MCHC RBC-ENTMCNC: 28.9 PG — SIGNIFICANT CHANGE UP (ref 27–34)
MCHC RBC-ENTMCNC: 33.9 GM/DL — SIGNIFICANT CHANGE UP (ref 32–36)
MCV RBC AUTO: 85.2 FL — SIGNIFICANT CHANGE UP (ref 80–100)
NRBC # BLD: 0 /100 WBCS — SIGNIFICANT CHANGE UP (ref 0–0)
PLATELET # BLD AUTO: 315 K/UL — SIGNIFICANT CHANGE UP (ref 150–400)
POTASSIUM SERPL-MCNC: 3.6 MMOL/L — SIGNIFICANT CHANGE UP (ref 3.5–5.3)
POTASSIUM SERPL-SCNC: 3.6 MMOL/L — SIGNIFICANT CHANGE UP (ref 3.5–5.3)
PROT SERPL-MCNC: 6.6 G/DL — SIGNIFICANT CHANGE UP (ref 6–8.3)
RBC # BLD: 4.46 M/UL — SIGNIFICANT CHANGE UP (ref 3.8–5.2)
RBC # FLD: 14.1 % — SIGNIFICANT CHANGE UP (ref 10.3–14.5)
SODIUM SERPL-SCNC: 143 MMOL/L — SIGNIFICANT CHANGE UP (ref 135–145)
WBC # BLD: 13.21 K/UL — HIGH (ref 3.8–10.5)
WBC # FLD AUTO: 13.21 K/UL — HIGH (ref 3.8–10.5)

## 2021-12-18 PROCEDURE — 99233 SBSQ HOSP IP/OBS HIGH 50: CPT

## 2021-12-18 RX ADMIN — Medication 12.5 MILLIGRAM(S): at 06:41

## 2021-12-18 RX ADMIN — ENOXAPARIN SODIUM 60 MILLIGRAM(S): 100 INJECTION SUBCUTANEOUS at 17:36

## 2021-12-18 RX ADMIN — DONEPEZIL HYDROCHLORIDE 5 MILLIGRAM(S): 10 TABLET, FILM COATED ORAL at 22:08

## 2021-12-18 RX ADMIN — REMDESIVIR 500 MILLIGRAM(S): 5 INJECTION INTRAVENOUS at 08:22

## 2021-12-18 RX ADMIN — Medication 650 MILLIGRAM(S): at 10:14

## 2021-12-18 RX ADMIN — AMLODIPINE BESYLATE 2.5 MILLIGRAM(S): 2.5 TABLET ORAL at 06:41

## 2021-12-18 RX ADMIN — PANTOPRAZOLE SODIUM 40 MILLIGRAM(S): 20 TABLET, DELAYED RELEASE ORAL at 06:41

## 2021-12-18 RX ADMIN — Medication 6 MILLIGRAM(S): at 06:41

## 2021-12-18 RX ADMIN — ENOXAPARIN SODIUM 60 MILLIGRAM(S): 100 INJECTION SUBCUTANEOUS at 06:42

## 2021-12-18 RX ADMIN — Medication 81 MILLIGRAM(S): at 12:37

## 2021-12-18 RX ADMIN — Medication 12.5 MILLIGRAM(S): at 17:36

## 2021-12-18 RX ADMIN — MEMANTINE HYDROCHLORIDE 5 MILLIGRAM(S): 10 TABLET ORAL at 12:37

## 2021-12-18 RX ADMIN — Medication 50 MILLIGRAM(S): at 12:37

## 2021-12-18 RX ADMIN — Medication 650 MILLIGRAM(S): at 10:35

## 2021-12-18 NOTE — PROGRESS NOTE ADULT - PROBLEM SELECTOR PLAN 3
Controlled,   C/w amlodipine and low dose metoprolol for now as per sure scripts  monitor bp  adjust meds as needed

## 2021-12-18 NOTE — PROGRESS NOTE ADULT - PROBLEM SELECTOR PLAN 1
P/w o2 sat 60%, unvaccinated, tested positive 10 days ago  chest xray with bl patchy infiltrates  episode of hypoxia in 60's now requiring 6L pendant  elevated d dimer  C/w decadron and remdesivir  C/w monitor lfts while on remdesivir,   C/w albuterol, tylenol, cough medicine prn  F/u covid labs  C/w full dose Lovenox

## 2021-12-18 NOTE — PROGRESS NOTE ADULT - SUBJECTIVE AND OBJECTIVE BOX
Patient is a 76y old  Female who presents with a chief complaint of COVID (18 Dec 2021 11:38)    INTERVAL HPI/OVERNIGHT EVENTS:     REVIEW OF SYSTEMS:  CONSTITUTIONAL: No fever, chills  ENMT:  No difficulty hearing, no change in vision  NECK: No pain or stiffness  RESPIRATORY: No cough, SOB  CARDIOVASCULAR: No chest pain, palpitations  GASTROINTESTINAL: No abdominal pain. No nausea, vomiting, or diarrhea  GENITOURINARY: No dysuria  NEUROLOGICAL: No HA  SKIN: No itching, burning, rashes, or lesions   LYMPH NODES: No enlarged glands  ENDOCRINE: No heat or cold intolerance; No hair loss  MUSCULOSKELETAL: No joint pain or swelling; No muscle, back, or extremity pain  PSYCHIATRIC: No depression, anxiety  HEME/LYMPH: No easy bruising, or bleeding gums    T(C): 36.1 (12-18-21 @ 13:27), Max: 36.4 (12-17-21 @ 16:00)  HR: 57 (12-18-21 @ 13:27) (55 - 64)  BP: 114/58 (12-18-21 @ 13:27) (114/58 - 141/67)  RR: 19 (12-18-21 @ 13:27) (18 - 22)  SpO2: 100% (12-18-21 @ 13:27) (90% - 100%)  Wt(kg): --Vital Signs Last 24 Hrs  T(C): 36.1 (18 Dec 2021 13:27), Max: 36.4 (17 Dec 2021 16:00)  T(F): 97 (18 Dec 2021 13:27), Max: 97.5 (17 Dec 2021 16:00)  HR: 57 (18 Dec 2021 13:27) (55 - 64)  BP: 114/58 (18 Dec 2021 13:27) (114/58 - 141/67)  BP(mean): --  RR: 19 (18 Dec 2021 13:27) (18 - 22)  SpO2: 100% (18 Dec 2021 13:27) (90% - 100%)    MEDICATIONS  (STANDING):  amLODIPine   Tablet 2.5 milliGRAM(s) Oral daily  aspirin  chewable 81 milliGRAM(s) Oral daily  dexAMETHasone  Injectable 6 milliGRAM(s) IV Push daily  donepezil 5 milliGRAM(s) Oral at bedtime  enoxaparin Injectable 60 milliGRAM(s) SubCutaneous every 12 hours  influenza  Vaccine (HIGH DOSE) 0.7 milliLiter(s) IntraMuscular once  memantine 5 milliGRAM(s) Oral daily  metoprolol tartrate 12.5 milliGRAM(s) Oral two times a day  pantoprazole    Tablet 40 milliGRAM(s) Oral before breakfast  remdesivir  IVPB   IV Intermittent   remdesivir  IVPB 100 milliGRAM(s) IV Intermittent every 24 hours  sodium chloride 0.9%. 1000 milliLiter(s) (75 mL/Hr) IV Continuous <Continuous>  traZODone 50 milliGRAM(s) Oral daily    MEDICATIONS  (PRN):  acetaminophen     Tablet .. 650 milliGRAM(s) Oral every 6 hours PRN Temp greater or equal to 38C (100.4F), Mild Pain (1 - 3), Moderate Pain (4 - 6)  aluminum hydroxide/magnesium hydroxide/simethicone Suspension 30 milliLiter(s) Oral every 4 hours PRN Dyspepsia  benzonatate 100 milliGRAM(s) Oral three times a day PRN Cough  ondansetron Injectable 4 milliGRAM(s) IV Push every 6 hours PRN Nausea and/or Vomiting      PHYSICAL EXAM:  GENERAL: NAD  EYES: clear conjunctiva; EOMI  ENMT: Moist mucous membranes  NECK: Supple, No JVD, Normal thyroid  CHEST/LUNG: Clear auscultation bilaterally; No rales, rhonchi, wheezing, or rubs  HEART: S1, S2, Regular rate and rhythm  ABDOMEN: Soft, Nontender, Nondistended; Bowel sounds present  NEURO: Alert & Oriented X3  EXTREMITIES: No LE edema, no calf tenderness  LYMPH: No lymphadenopathy noted  SKIN: No rashes or lesions    Consultant(s) Notes Reviewed:  [x ] YES  [ ] NO  Care Discussed with Consultants/Other Providers [ x] YES  [ ] NO    LABS:                        12.9   13.21 )-----------( 315      ( 18 Dec 2021 06:23 )             38.0     12-18    143  |  112<H>  |  29<H>  ----------------------------<  128<H>  3.6   |  25  |  0.88    Ca    8.7      18 Dec 2021 06:23  Phos  3.0     12-17  Mg     2.5     12-17    TPro  6.6  /  Alb  2.1<L>  /  TBili  1.4<H>  /  DBili  x   /  AST  44<H>  /  ALT  59  /  AlkPhos  72  12-18    PT/INR - ( 17 Dec 2021 05:41 )   PT: 15.1 sec;   INR: 1.28 ratio         PTT - ( 17 Dec 2021 05:41 )  PTT:22.8 sec  CAPILLARY BLOOD GLUCOSE    RADIOLOGY & ADDITIONAL TESTS:    Imaging Personally Reviewed:  [x ] YES  [ ] NO  < from: Xray Chest 1 View-PORTABLE IMMEDIATE (12.16.21 @ 23:40) >    ACC: [id number] EXAM:  XR CHEST PORTABLE IMMED 1V                          PROCEDURE DATE:  12/16/2021          INTERPRETATION:  Clinical history: 76-year-old female, shortness of   breath, cough and fever.    Portable view of the chest without comparison demonstrates a normal   cardiac silhouette and normal pulmonary vasculature with no pneumothorax   or acute osseous finding.    Extensive bilateral alveolar infiltrates.    IMPRESSION:  Extensive bilateral alveolar infiltrates, Covid 19 should be excluded    --- End of Report ---            DARIA SYK DO; Attending Radiologist  This document has been electronically signed. Dec 17 2021  7:49AM    < end of copied text >     Patient is a 76y old  Female who presents with a chief complaint of COVID (18 Dec 2021 11:38)    INTERVAL HPI/OVERNIGHT EVENTS: episode of hypoxia overnight    REVIEW OF SYSTEMS:  CONSTITUTIONAL: No fever, chills  ENMT:  No difficulty hearing, no change in vision  NECK: No pain or stiffness  RESPIRATORY: No cough, SOB  CARDIOVASCULAR: No chest pain, palpitations  GASTROINTESTINAL: No abdominal pain. No nausea, vomiting, or diarrhea  GENITOURINARY: No dysuria  NEUROLOGICAL: No HA  SKIN: No itching, burning, rashes, or lesions   LYMPH NODES: No enlarged glands  ENDOCRINE: No heat or cold intolerance; No hair loss  MUSCULOSKELETAL: No joint pain or swelling; No muscle, back, or extremity pain  PSYCHIATRIC: No depression, anxiety  HEME/LYMPH: No easy bruising, or bleeding gums    T(C): 36.1 (12-18-21 @ 13:27), Max: 36.4 (12-17-21 @ 16:00)  HR: 57 (12-18-21 @ 13:27) (55 - 64)  BP: 114/58 (12-18-21 @ 13:27) (114/58 - 141/67)  RR: 19 (12-18-21 @ 13:27) (18 - 22)  SpO2: 100% (12-18-21 @ 13:27) (90% - 100%)  Wt(kg): --Vital Signs Last 24 Hrs  T(C): 36.1 (18 Dec 2021 13:27), Max: 36.4 (17 Dec 2021 16:00)  T(F): 97 (18 Dec 2021 13:27), Max: 97.5 (17 Dec 2021 16:00)  HR: 57 (18 Dec 2021 13:27) (55 - 64)  BP: 114/58 (18 Dec 2021 13:27) (114/58 - 141/67)  BP(mean): --  RR: 19 (18 Dec 2021 13:27) (18 - 22)  SpO2: 100% (18 Dec 2021 13:27) (90% - 100%)    MEDICATIONS  (STANDING):  amLODIPine   Tablet 2.5 milliGRAM(s) Oral daily  aspirin  chewable 81 milliGRAM(s) Oral daily  dexAMETHasone  Injectable 6 milliGRAM(s) IV Push daily  donepezil 5 milliGRAM(s) Oral at bedtime  enoxaparin Injectable 60 milliGRAM(s) SubCutaneous every 12 hours  influenza  Vaccine (HIGH DOSE) 0.7 milliLiter(s) IntraMuscular once  memantine 5 milliGRAM(s) Oral daily  metoprolol tartrate 12.5 milliGRAM(s) Oral two times a day  pantoprazole    Tablet 40 milliGRAM(s) Oral before breakfast  remdesivir  IVPB   IV Intermittent   remdesivir  IVPB 100 milliGRAM(s) IV Intermittent every 24 hours  sodium chloride 0.9%. 1000 milliLiter(s) (75 mL/Hr) IV Continuous <Continuous>  traZODone 50 milliGRAM(s) Oral daily    MEDICATIONS  (PRN):  acetaminophen     Tablet .. 650 milliGRAM(s) Oral every 6 hours PRN Temp greater or equal to 38C (100.4F), Mild Pain (1 - 3), Moderate Pain (4 - 6)  aluminum hydroxide/magnesium hydroxide/simethicone Suspension 30 milliLiter(s) Oral every 4 hours PRN Dyspepsia  benzonatate 100 milliGRAM(s) Oral three times a day PRN Cough  ondansetron Injectable 4 milliGRAM(s) IV Push every 6 hours PRN Nausea and/or Vomiting      PHYSICAL EXAM:  GENERAL: NAD  EYES: clear conjunctiva; EOMI  ENMT: Moist mucous membranes  NECK: Supple, No JVD, Normal thyroid  CHEST/LUNG: Clear auscultation bilaterally; No rales, rhonchi, wheezing, or rubs  HEART: S1, S2, Regular rate and rhythm  ABDOMEN: Soft, Nontender, Nondistended; Bowel sounds present  NEURO: Alert & Oriented X3  EXTREMITIES: No LE edema, no calf tenderness  LYMPH: No lymphadenopathy noted  SKIN: No rashes or lesions    Consultant(s) Notes Reviewed:  [x ] YES  [ ] NO  Care Discussed with Consultants/Other Providers [ x] YES  [ ] NO    LABS:                        12.9   13.21 )-----------( 315      ( 18 Dec 2021 06:23 )             38.0     12-18    143  |  112<H>  |  29<H>  ----------------------------<  128<H>  3.6   |  25  |  0.88    Ca    8.7      18 Dec 2021 06:23  Phos  3.0     12-17  Mg     2.5     12-17    TPro  6.6  /  Alb  2.1<L>  /  TBili  1.4<H>  /  DBili  x   /  AST  44<H>  /  ALT  59  /  AlkPhos  72  12-18    PT/INR - ( 17 Dec 2021 05:41 )   PT: 15.1 sec;   INR: 1.28 ratio         PTT - ( 17 Dec 2021 05:41 )  PTT:22.8 sec  CAPILLARY BLOOD GLUCOSE    RADIOLOGY & ADDITIONAL TESTS:    Imaging Personally Reviewed:  [x ] YES  [ ] NO  < from: Xray Chest 1 View-PORTABLE IMMEDIATE (12.16.21 @ 23:40) >    ACC: [id number] EXAM:  XR CHEST PORTABLE IMMED 1V                          PROCEDURE DATE:  12/16/2021          INTERPRETATION:  Clinical history: 76-year-old female, shortness of   breath, cough and fever.    Portable view of the chest without comparison demonstrates a normal   cardiac silhouette and normal pulmonary vasculature with no pneumothorax   or acute osseous finding.    Extensive bilateral alveolar infiltrates.    IMPRESSION:  Extensive bilateral alveolar infiltrates, Covid 19 should be excluded    --- End of Report ---            DARIA SKY DO; Attending Radiologist  This document has been electronically signed. Dec 17 2021  7:49AM    < end of copied text >

## 2021-12-18 NOTE — CONSULT NOTE ADULT - SUBJECTIVE AND OBJECTIVE BOX
Time of visit:    CHIEF COMPLAINT: Patient is a 76y old  Female who presents with a chief complaint of COVID (17 Dec 2021 13:06)      HPI:  Pt is a 75 y/o F with hx of HTN, HLD, Dementia? unvaccinated for covid, who presented to the ED with complains of weakness and hypoxia. Pt states that she developed sxs of covid 2 weeks ago and tested positive 10 days ago. Since then she has had progressively worsening fatigue, low appetite, fevers. She also endorsed on and off chest pain, and palpitations, currently denied any chest pain. She also endorses "diarrhea", described as watery bms once a day. Today she developed dizziness and nausea.  As per ED note EMS noted pt to be saturating at 60% on room air. Pt saturating at 98% on 6L.    Chest Xray showing extensive bl patchy infiltrates.  (17 Dec 2021 02:49)   Patient seen and examined.     PAST MEDICAL & SURGICAL HISTORY:  No pertinent past medical history    HTN (hypertension)    HLD (hyperlipidemia)    No significant past surgical history        Allergies    No Known Allergies    Intolerances        MEDICATIONS  (STANDING):  amLODIPine   Tablet 2.5 milliGRAM(s) Oral daily  aspirin  chewable 81 milliGRAM(s) Oral daily  dexAMETHasone  Injectable 6 milliGRAM(s) IV Push daily  donepezil 5 milliGRAM(s) Oral at bedtime  enoxaparin Injectable 60 milliGRAM(s) SubCutaneous every 12 hours  influenza  Vaccine (HIGH DOSE) 0.7 milliLiter(s) IntraMuscular once  memantine 5 milliGRAM(s) Oral daily  metoprolol tartrate 12.5 milliGRAM(s) Oral two times a day  pantoprazole    Tablet 40 milliGRAM(s) Oral before breakfast  remdesivir  IVPB   IV Intermittent   remdesivir  IVPB 100 milliGRAM(s) IV Intermittent every 24 hours  sodium chloride 0.9%. 1000 milliLiter(s) (75 mL/Hr) IV Continuous <Continuous>  traZODone 50 milliGRAM(s) Oral daily      MEDICATIONS  (PRN):  acetaminophen     Tablet .. 650 milliGRAM(s) Oral every 6 hours PRN Temp greater or equal to 38C (100.4F), Mild Pain (1 - 3), Moderate Pain (4 - 6)  aluminum hydroxide/magnesium hydroxide/simethicone Suspension 30 milliLiter(s) Oral every 4 hours PRN Dyspepsia  benzonatate 100 milliGRAM(s) Oral three times a day PRN Cough  ondansetron Injectable 4 milliGRAM(s) IV Push every 6 hours PRN Nausea and/or Vomiting   Medications up to date at time of exam.    Medications up to date at time of exam.    FAMILY HISTORY:  No pertinent family history in first degree relatives        SOCIAL HISTORY  Smoking History: [   ] smoking/smoke exposure, [   ] former smoker  Living Condition: [   ] apartment, [   ] private house  Work History:   Travel History: denies recent travel  Illicit Substance Use: denies  Alcohol Use: denies    REVIEW OF SYSTEMS:    CONSTITUTIONAL:  denies fevers, chills, sweats, weight loss    HEENT:  denies diplopia or blurred vision, sore throat or runny nose.    CARDIOVASCULAR:  denies pressure, squeezing, tightness, or heaviness about the chest; no palpitations.    RESPIRATORY:  denies SOB, cough, THAKUR, wheezing.    GASTROINTESTINAL:  denies abdominal pain, nausea, vomiting or diarrhea.    GENITOURINARY: denies dysuria, frequency or urgency.    NEUROLOGIC:  denies numbness, tingling, seizures or weakness.    PSYCHIATRIC:  denies disorder of thought or mood.    MSK: denies swelling, redness      PHYSICAL EXAMINATION:    GENERAL: The patient is a well-developed, well-nourished, in no apparent distress.     Vital Signs Last 24 Hrs  T(C): 36.1 (18 Dec 2021 05:43), Max: 36.4 (17 Dec 2021 16:00)  T(F): 97 (18 Dec 2021 05:43), Max: 97.5 (17 Dec 2021 16:00)  HR: 55 (18 Dec 2021 05:43) (55 - 64)  BP: 115/60 (18 Dec 2021 05:43) (115/60 - 141/67)  BP(mean): --  RR: 20 (18 Dec 2021 09:31) (18 - 22)  SpO2: 96% (18 Dec 2021 09:31) (90% - 99%)   (if applicable)    Chest Tube (if applicable)    HEENT: Head is normocephalic and atraumatic. Extraocular muscles are intact. Mucous membranes are moist.     NECK: Supple, no palpable adenopathy.    LUNGS: Clear to auscultation, no wheezing, rales, or rhonchi.    HEART: Regular rate and rhythm without murmur.    ABDOMEN: Soft, nontender, and nondistended.  No hepatosplenomegaly is noted.    RENAL: No difficulty voiding, no pelvic pain    EXTREMITIES: Without any cyanosis, clubbing, rash, lesions or edema.    NEUROLOGIC: Awake, alert, oriented, grossly intact    SKIN: Warm, dry, good turgor.      LABS:                        12.9   13.21 )-----------( 315      ( 18 Dec 2021 06:23 )             38.0     12-18    143  |  112<H>  |  29<H>  ----------------------------<  128<H>  3.6   |  25  |  0.88    Ca    8.7      18 Dec 2021 06:23  Phos  3.0     12-17  Mg     2.5     12-17    TPro  6.6  /  Alb  2.1<L>  /  TBili  1.4<H>  /  DBili  x   /  AST  44<H>  /  ALT  59  /  AlkPhos  72  12-18    PT/INR - ( 17 Dec 2021 05:41 )   PT: 15.1 sec;   INR: 1.28 ratio         PTT - ( 17 Dec 2021 05:41 )  PTT:22.8 sec      CARDIAC MARKERS ( 17 Dec 2021 05:41 )  x     / x     / 40 U/L / x     / x            Serum Pro-Brain Natriuretic Peptide: 411 pg/mL (12-16-21 @ 23:31)      Procalcitonin, Serum: 0.11 ng/mL (12-17-21 @ 09:27)  Procalcitonin, Serum: 0.10 ng/mL (12-17-21 @ 03:13)      MICROBIOLOGY: (if applicable)    RADIOLOGY & ADDITIONAL STUDIES:  EKG:   CXR:< from: Xray Chest 1 View-PORTABLE IMMEDIATE (12.16.21 @ 23:40) >    ACC: [id number] EXAM:  XR CHEST PORTABLE IMMED 1V                          PROCEDURE DATE:  12/16/2021          INTERPRETATION:  Clinical history: 76-year-old female, shortness of   breath, cough and fever.    Portable view of the chest without comparison demonstrates a normal   cardiac silhouette and normal pulmonary vasculature with no pneumothorax   or acute osseous finding.    Extensive bilateral alveolar infiltrates.    IMPRESSION:  Extensive bilateral alveolar infiltrates, Covid 19 should be excluded    --- End of Report ---            DARIA SKY DO; Attending Radiologist  This document has been electronically signed. Dec 17 2021  7:49AM    < end of copied text >    ECHO:    IMPRESSION: 76y Female PAST MEDICAL & SURGICAL HISTORY:  No pertinent past medical history    HTN (hypertension)    HLD (hyperlipidemia)    No significant past surgical history     p/w                   RECOMMENDATIONS:   Time of visit:    CHIEF COMPLAINT: Patient is a 76y old  Female who presents with a chief complaint of COVID (17 Dec 2021 13:06)      HPI:  Pt is a 75 y/o F with hx of HTN, HLD, Dementia? unvaccinated for covid, who presented to the ED with complains of weakness and hypoxia. Pt states that she developed sxs of covid 2 weeks ago and tested positive 10 days ago. Since then she has had progressively worsening fatigue, low appetite, fevers. She also endorsed on and off chest pain, and palpitations, currently denied any chest pain. She also endorses "diarrhea", described as watery bms once a day. Today she developed dizziness and nausea.  As per ED note EMS noted pt to be saturating at 60% on room air. Pt saturating at 98% on 6L.    Chest Xray showing extensive bl patchy infiltrates.  (17 Dec 2021 02:49)   Patient seen and examined.     PAST MEDICAL & SURGICAL HISTORY:  No pertinent past medical history    HTN (hypertension)    HLD (hyperlipidemia)    No significant past surgical history        Allergies    No Known Allergies    Intolerances        MEDICATIONS  (STANDING):  amLODIPine   Tablet 2.5 milliGRAM(s) Oral daily  aspirin  chewable 81 milliGRAM(s) Oral daily  dexAMETHasone  Injectable 6 milliGRAM(s) IV Push daily  donepezil 5 milliGRAM(s) Oral at bedtime  enoxaparin Injectable 60 milliGRAM(s) SubCutaneous every 12 hours  influenza  Vaccine (HIGH DOSE) 0.7 milliLiter(s) IntraMuscular once  memantine 5 milliGRAM(s) Oral daily  metoprolol tartrate 12.5 milliGRAM(s) Oral two times a day  pantoprazole    Tablet 40 milliGRAM(s) Oral before breakfast  remdesivir  IVPB   IV Intermittent   remdesivir  IVPB 100 milliGRAM(s) IV Intermittent every 24 hours  sodium chloride 0.9%. 1000 milliLiter(s) (75 mL/Hr) IV Continuous <Continuous>  traZODone 50 milliGRAM(s) Oral daily      MEDICATIONS  (PRN):  acetaminophen     Tablet .. 650 milliGRAM(s) Oral every 6 hours PRN Temp greater or equal to 38C (100.4F), Mild Pain (1 - 3), Moderate Pain (4 - 6)  aluminum hydroxide/magnesium hydroxide/simethicone Suspension 30 milliLiter(s) Oral every 4 hours PRN Dyspepsia  benzonatate 100 milliGRAM(s) Oral three times a day PRN Cough  ondansetron Injectable 4 milliGRAM(s) IV Push every 6 hours PRN Nausea and/or Vomiting   Medications up to date at time of exam.    Medications up to date at time of exam.    FAMILY HISTORY:  No pertinent family history in first degree relatives        SOCIAL HISTORY  Smoking History: [ x  ]  none smoking/smoke exposure, [   ] former smoker  Living Condition: [   ] apartment, [   ] private house  Work History:   Travel History: denies recent travel  Illicit Substance Use: denies  Alcohol Use: denies    REVIEW OF SYSTEMS:    CONSTITUTIONAL:  denies fevers, chills, sweats, weight loss    HEENT:  denies diplopia or blurred vision, sore throat or runny nose.    CARDIOVASCULAR:  denies pressure, squeezing, tightness, or heaviness about the chest; no palpitations.    RESPIRATORY:  denies SOB, cough, THAKUR, wheezing.    GASTROINTESTINAL:  denies abdominal pain, nausea, vomiting or diarrhea.    GENITOURINARY: denies dysuria, frequency or urgency.    NEUROLOGIC:  denies numbness, tingling, seizures or weakness.    PSYCHIATRIC:  denies disorder of thought or mood.    MSK: denies swelling, redness      PHYSICAL EXAMINATION:    GENERAL: The patient is a well-developed, well-nourished, in no apparent distress.     Vital Signs Last 24 Hrs  T(C): 36.1 (18 Dec 2021 05:43), Max: 36.4 (17 Dec 2021 16:00)  T(F): 97 (18 Dec 2021 05:43), Max: 97.5 (17 Dec 2021 16:00)  HR: 55 (18 Dec 2021 05:43) (55 - 64)  BP: 115/60 (18 Dec 2021 05:43) (115/60 - 141/67)  BP(mean): --  RR: 20 (18 Dec 2021 09:31) (18 - 22)  SpO2: 96% (18 Dec 2021 09:31) (90% - 99%)   (if applicable)    Chest Tube (if applicable)    HEENT: Head is normocephalic and atraumatic. Extraocular muscles are intact. Mucous membranes are moist.     NECK: Supple, no palpable adenopathy.    LUNGS: Clear to auscultation, no wheezing, rales, or rhonchi.    HEART: Regular rate and rhythm without murmur.    ABDOMEN: Soft, nontender, and nondistended.  No hepatosplenomegaly is noted.    RENAL: No difficulty voiding, no pelvic pain    EXTREMITIES: Without any cyanosis, clubbing, rash, lesions or edema.    NEUROLOGIC: Awake, alert, oriented, grossly intact    SKIN: Warm, dry, good turgor.      LABS:                        12.9   13.21 )-----------( 315      ( 18 Dec 2021 06:23 )             38.0     12-18    143  |  112<H>  |  29<H>  ----------------------------<  128<H>  3.6   |  25  |  0.88    Ca    8.7      18 Dec 2021 06:23  Phos  3.0     12-17  Mg     2.5     12-17    TPro  6.6  /  Alb  2.1<L>  /  TBili  1.4<H>  /  DBili  x   /  AST  44<H>  /  ALT  59  /  AlkPhos  72  12-18    PT/INR - ( 17 Dec 2021 05:41 )   PT: 15.1 sec;   INR: 1.28 ratio         PTT - ( 17 Dec 2021 05:41 )  PTT:22.8 sec      CARDIAC MARKERS ( 17 Dec 2021 05:41 )  x     / x     / 40 U/L / x     / x            Serum Pro-Brain Natriuretic Peptide: 411 pg/mL (12-16-21 @ 23:31)      Procalcitonin, Serum: 0.11 ng/mL (12-17-21 @ 09:27)  Procalcitonin, Serum: 0.10 ng/mL (12-17-21 @ 03:13)      MICROBIOLOGY: (if applicable)    RADIOLOGY & ADDITIONAL STUDIES:  EKG:   CXR:< from: Xray Chest 1 View-PORTABLE IMMEDIATE (12.16.21 @ 23:40) >    ACC: [id number] EXAM:  XR CHEST PORTABLE IMMED 1V                          PROCEDURE DATE:  12/16/2021          INTERPRETATION:  Clinical history: 76-year-old female, shortness of   breath, cough and fever.    Portable view of the chest without comparison demonstrates a normal   cardiac silhouette and normal pulmonary vasculature with no pneumothorax   or acute osseous finding.    Extensive bilateral alveolar infiltrates.    IMPRESSION:  Extensive bilateral alveolar infiltrates, Covid 19 should be excluded    --- End of Report ---            DARIA SKY DO; Attending Radiologist  This document has been electronically signed. Dec 17 2021  7:49AM    < end of copied text >    ECHO:    IMPRESSION: 76y Female PAST MEDICAL & SURGICAL HISTORY:  No pertinent past medical history    HTN (hypertension)    HLD (hyperlipidemia)    No significant past surgical history     p/w         IMP: This is  76 yr old woman  with  HTN, HLD, Dementia? unvaccinated for covid, admitted for acute hypoxic resp failure due to b/l pna secondary to Covid-19 infection requiring high concentration o2 supp.  Pat was started on decadron and remdesivir .  A1c elevated in prediabetic range       Sugg;  - Continue O2 supp as needed to maintain sat >90%  - Isolation : contact and airborne   - Monitor biomarkers TIW   - F/U cultures   - Remdesivir daily x 5 days   - Decadron 6 mg /day x 10 doses then taper   - Control BP  - Monitor blood sugar with coverage   - If O2 requirement increase , consider icu eval

## 2021-12-18 NOTE — PROGRESS NOTE ADULT - PROBLEM SELECTOR PLAN 5
likely due to COvid  Alb: 2.2 g/dL / Pro: 6.9 g/dL / ALK PHOS: 84 U/L / ALT: 91 U/L DA / AST: 105 U/L / GGT: x         T bili 2  f/u direct and indirect bili  monitor lfts

## 2021-12-18 NOTE — PROGRESS NOTE ADULT - ASSESSMENT
Pt is a 77 y/o F with hx of HTN, HLD, Dementia? who presented to the ED with complaints of weakness and hypoxia. CXr significant for Extensive bilateral alveolar infiltrates. Pt had episode of hypoxia in 60's overnight with NRB mask, now saturating well 6L pendant. Admitted  for acute hypoxias respiratory failure due to Covid PNA, Pulm following   Pt is a 77 y/o F with hx of HTN, HLD, Dementia? who presented to the ED with complaints of weakness and hypoxia. CXr significant for Extensive bilateral alveolar infiltrates. Pt had episode of hypoxia in 60's overnight with NRB mask, now saturating well 6L pendant. Admitted  for acute hypoxias respiratory failure due to Covid PNA, Pulm following    attempted to call pharmacy for med recs, Pharmacy closed today.

## 2021-12-19 PROCEDURE — 99233 SBSQ HOSP IP/OBS HIGH 50: CPT

## 2021-12-19 RX ADMIN — Medication 12.5 MILLIGRAM(S): at 17:22

## 2021-12-19 RX ADMIN — Medication 81 MILLIGRAM(S): at 11:32

## 2021-12-19 RX ADMIN — ENOXAPARIN SODIUM 60 MILLIGRAM(S): 100 INJECTION SUBCUTANEOUS at 05:14

## 2021-12-19 RX ADMIN — PANTOPRAZOLE SODIUM 40 MILLIGRAM(S): 20 TABLET, DELAYED RELEASE ORAL at 07:53

## 2021-12-19 RX ADMIN — MEMANTINE HYDROCHLORIDE 5 MILLIGRAM(S): 10 TABLET ORAL at 11:33

## 2021-12-19 RX ADMIN — DONEPEZIL HYDROCHLORIDE 5 MILLIGRAM(S): 10 TABLET, FILM COATED ORAL at 23:30

## 2021-12-19 RX ADMIN — REMDESIVIR 500 MILLIGRAM(S): 5 INJECTION INTRAVENOUS at 08:21

## 2021-12-19 RX ADMIN — Medication 50 MILLIGRAM(S): at 13:04

## 2021-12-19 RX ADMIN — Medication 6 MILLIGRAM(S): at 05:14

## 2021-12-19 RX ADMIN — ENOXAPARIN SODIUM 60 MILLIGRAM(S): 100 INJECTION SUBCUTANEOUS at 17:22

## 2021-12-19 RX ADMIN — AMLODIPINE BESYLATE 2.5 MILLIGRAM(S): 2.5 TABLET ORAL at 05:17

## 2021-12-19 RX ADMIN — Medication 12.5 MILLIGRAM(S): at 05:16

## 2021-12-19 NOTE — PROGRESS NOTE ADULT - ASSESSMENT
Pt is a 77 y/o F with hx of HTN, HLD, Dementia? who presented to the ED with complaints of weakness and hypoxia. CXr significant for Extensive bilateral alveolar infiltrates. Pt had episode of hypoxia in 60's overnight with NRB mask, now saturating well 6L pendant. Admitted  for acute hypoxias respiratory failure due to Covid PNA, Pulm following    attempted to call pharmacy for med recs, Pharmacy closed today.

## 2021-12-19 NOTE — PROGRESS NOTE ADULT - PROBLEM SELECTOR PLAN 1
switched to 5 Litre NC today from pendant  C/w decadron and remdesivir  C/w monitor lfts while on remdesivir,   C/w albuterol, tylenol, cough medicine prn  F/u covid labs  C/w full dose Lovenox

## 2021-12-19 NOTE — PROGRESS NOTE ADULT - SUBJECTIVE AND OBJECTIVE BOX
Patient is a 76y old  Female who presents with a chief complaint of COVID (18 Dec 2021 14:39)      SUBJECTIVE / OVERNIGHT EVENTS: c.o headache and cough. sob improved. on 5 litre NC in the morning. per RN patient gets confused sometime, on home Aricept and Namenda here. She is re- directable and responds appropriately on exam and interview  rest of [id number] and are negative    MEDICATIONS  (STANDING):  amLODIPine   Tablet 2.5 milliGRAM(s) Oral daily  aspirin  chewable 81 milliGRAM(s) Oral daily  dexAMETHasone  Injectable 6 milliGRAM(s) IV Push daily  donepezil 5 milliGRAM(s) Oral at bedtime  enoxaparin Injectable 60 milliGRAM(s) SubCutaneous every 12 hours  influenza  Vaccine (HIGH DOSE) 0.7 milliLiter(s) IntraMuscular once  memantine 5 milliGRAM(s) Oral daily  metoprolol tartrate 12.5 milliGRAM(s) Oral two times a day  pantoprazole    Tablet 40 milliGRAM(s) Oral before breakfast  remdesivir  IVPB   IV Intermittent   remdesivir  IVPB 100 milliGRAM(s) IV Intermittent every 24 hours  sodium chloride 0.9%. 1000 milliLiter(s) (75 mL/Hr) IV Continuous <Continuous>  traZODone 50 milliGRAM(s) Oral daily    MEDICATIONS  (PRN):  acetaminophen     Tablet .. 650 milliGRAM(s) Oral every 6 hours PRN Temp greater or equal to 38C (100.4F), Mild Pain (1 - 3), Moderate Pain (4 - 6)  aluminum hydroxide/magnesium hydroxide/simethicone Suspension 30 milliLiter(s) Oral every 4 hours PRN Dyspepsia  benzonatate 100 milliGRAM(s) Oral three times a day PRN Cough  guaiFENesin Oral Liquid (Sugar-Free) 200 milliGRAM(s) Oral every 6 hours PRN Cough  ondansetron Injectable 4 milliGRAM(s) IV Push every 6 hours PRN Nausea and/or Vomiting      Vital Signs Last 24 Hrs  T(C): 36.1 (12-19-21 @ 13:24)  T(F): 96.9 (12-19-21 @ 13:24), Max: 97 (12-19-21 @ 05:09)  HR: 66 (12-19-21 @ 13:24) (56 - 66)  BP: 133/68 (12-19-21 @ 13:24)  BP(mean): --  RR: 21 (12-19-21 @ 13:24) (19 - 22)  SpO2: 93% (12-19-21 @ 13:24) (89% - 97%)  Wt(kg): --    CAPILLARY BLOOD GLUCOSE        I&O's Summary      PHYSICAL EXAM:  GENERAL: NAD, well-developed  HEAD:  Atraumatic, Normocephalic  EYES: EOMI, PERRLA, conjunctiva and sclera clear  NECK: Supple, No JVD  CHEST/LUNG: coarse bs+ bl, no wheeze  HEART: Regular rate and rhythm; No murmurs, rubs, or gallops  ABDOMEN: Soft, Nontender, Nondistended; Bowel sounds present  EXTREMITIES:  2+ Peripheral Pulses, No clubbing, cyanosis, or edema  PSYCH: alert awake x2  NEUROLOGY: non-focal  SKIN: No rashes or lesions    LABS:                        12.9   13.21 )-----------( 315      ( 18 Dec 2021 06:23 )             38.0     12-18    143  |  112<H>  |  29<H>  ----------------------------<  128<H>  3.6   |  25  |  0.88    Ca    8.7      18 Dec 2021 06:23    TPro  6.6  /  Alb  2.1<L>  /  TBili  1.4<H>  /  DBili  x   /  AST  44<H>  /  ALT  59  /  AlkPhos  72  12-18              RADIOLOGY & ADDITIONAL TESTS:    Imaging Personally Reviewed:    Consultant(s) Notes Reviewed:      Care Discussed with Consultants/Other Providers:    Assessment and Plan:

## 2021-12-20 DIAGNOSIS — F03.90 UNSPECIFIED DEMENTIA WITHOUT BEHAVIORAL DISTURBANCE: ICD-10-CM

## 2021-12-20 DIAGNOSIS — Z02.9 ENCOUNTER FOR ADMINISTRATIVE EXAMINATIONS, UNSPECIFIED: ICD-10-CM

## 2021-12-20 LAB
ALBUMIN SERPL ELPH-MCNC: 2.1 G/DL — LOW (ref 3.5–5)
ALP SERPL-CCNC: 63 U/L — SIGNIFICANT CHANGE UP (ref 40–120)
ALT FLD-CCNC: 52 U/L DA — SIGNIFICANT CHANGE UP (ref 10–60)
ANION GAP SERPL CALC-SCNC: 8 MMOL/L — SIGNIFICANT CHANGE UP (ref 5–17)
AST SERPL-CCNC: 52 U/L — HIGH (ref 10–40)
BASOPHILS # BLD AUTO: 0 K/UL — SIGNIFICANT CHANGE UP (ref 0–0.2)
BASOPHILS NFR BLD AUTO: 0 % — SIGNIFICANT CHANGE UP (ref 0–2)
BILIRUB SERPL-MCNC: 0.7 MG/DL — SIGNIFICANT CHANGE UP (ref 0.2–1.2)
BUN SERPL-MCNC: 22 MG/DL — HIGH (ref 7–18)
CALCIUM SERPL-MCNC: 8.3 MG/DL — LOW (ref 8.4–10.5)
CHLORIDE SERPL-SCNC: 109 MMOL/L — HIGH (ref 96–108)
CO2 SERPL-SCNC: 24 MMOL/L — SIGNIFICANT CHANGE UP (ref 22–31)
CREAT SERPL-MCNC: 0.73 MG/DL — SIGNIFICANT CHANGE UP (ref 0.5–1.3)
D DIMER BLD IA.RAPID-MCNC: 1869 NG/ML DDU — HIGH
D DIMER BLD IA.RAPID-MCNC: 1886 NG/ML DDU — HIGH
EOSINOPHIL # BLD AUTO: 0 K/UL — SIGNIFICANT CHANGE UP (ref 0–0.5)
EOSINOPHIL NFR BLD AUTO: 0 % — SIGNIFICANT CHANGE UP (ref 0–6)
FERRITIN SERPL-MCNC: 2132 NG/ML — HIGH (ref 15–150)
GIANT PLATELETS BLD QL SMEAR: PRESENT — SIGNIFICANT CHANGE UP
GLUCOSE SERPL-MCNC: 91 MG/DL — SIGNIFICANT CHANGE UP (ref 70–99)
HCT VFR BLD CALC: 35.1 % — SIGNIFICANT CHANGE UP (ref 34.5–45)
HGB BLD-MCNC: 12 G/DL — SIGNIFICANT CHANGE UP (ref 11.5–15.5)
LDH SERPL L TO P-CCNC: 398 U/L — HIGH (ref 120–225)
LYMPHOCYTES # BLD AUTO: 0.76 K/UL — LOW (ref 1–3.3)
LYMPHOCYTES # BLD AUTO: 5 % — LOW (ref 13–44)
MAGNESIUM SERPL-MCNC: 2.4 MG/DL — SIGNIFICANT CHANGE UP (ref 1.6–2.6)
MANUAL SMEAR VERIFICATION: SIGNIFICANT CHANGE UP
MCHC RBC-ENTMCNC: 28.7 PG — SIGNIFICANT CHANGE UP (ref 27–34)
MCHC RBC-ENTMCNC: 34.2 GM/DL — SIGNIFICANT CHANGE UP (ref 32–36)
MCV RBC AUTO: 84 FL — SIGNIFICANT CHANGE UP (ref 80–100)
MONOCYTES # BLD AUTO: 0.91 K/UL — HIGH (ref 0–0.9)
MONOCYTES NFR BLD AUTO: 6 % — SIGNIFICANT CHANGE UP (ref 2–14)
MRSA PCR RESULT.: SIGNIFICANT CHANGE UP
MYELOCYTES NFR BLD: 1 % — HIGH (ref 0–0)
NEUTROPHILS # BLD AUTO: 13.26 K/UL — HIGH (ref 1.8–7.4)
NEUTROPHILS NFR BLD AUTO: 86 % — HIGH (ref 43–77)
NEUTS BAND # BLD: 1 % — SIGNIFICANT CHANGE UP (ref 0–8)
NRBC # BLD: 0 /100 — SIGNIFICANT CHANGE UP (ref 0–0)
OVALOCYTES BLD QL SMEAR: SIGNIFICANT CHANGE UP
PHOSPHATE SERPL-MCNC: 2.8 MG/DL — SIGNIFICANT CHANGE UP (ref 2.5–4.5)
PLAT MORPH BLD: NORMAL — SIGNIFICANT CHANGE UP
PLATELET # BLD AUTO: 308 K/UL — SIGNIFICANT CHANGE UP (ref 150–400)
POTASSIUM SERPL-MCNC: 3.5 MMOL/L — SIGNIFICANT CHANGE UP (ref 3.5–5.3)
POTASSIUM SERPL-SCNC: 3.5 MMOL/L — SIGNIFICANT CHANGE UP (ref 3.5–5.3)
PROCALCITONIN SERPL-MCNC: 0.1 NG/ML — SIGNIFICANT CHANGE UP (ref 0.02–0.1)
PROT SERPL-MCNC: 5.6 G/DL — LOW (ref 6–8.3)
RBC # BLD: 4.18 M/UL — SIGNIFICANT CHANGE UP (ref 3.8–5.2)
RBC # FLD: 14.2 % — SIGNIFICANT CHANGE UP (ref 10.3–14.5)
RBC BLD AUTO: ABNORMAL
S AUREUS DNA NOSE QL NAA+PROBE: SIGNIFICANT CHANGE UP
SODIUM SERPL-SCNC: 141 MMOL/L — SIGNIFICANT CHANGE UP (ref 135–145)
VARIANT LYMPHS # BLD: 1 % — SIGNIFICANT CHANGE UP (ref 0–6)
WBC # BLD: 15.24 K/UL — HIGH (ref 3.8–10.5)
WBC # FLD AUTO: 15.24 K/UL — HIGH (ref 3.8–10.5)

## 2021-12-20 PROCEDURE — 99233 SBSQ HOSP IP/OBS HIGH 50: CPT

## 2021-12-20 RX ORDER — OLANZAPINE 15 MG/1
2.5 TABLET, FILM COATED ORAL AT BEDTIME
Refills: 0 | Status: DISCONTINUED | OUTPATIENT
Start: 2021-12-20 | End: 2021-12-23

## 2021-12-20 RX ADMIN — PANTOPRAZOLE SODIUM 40 MILLIGRAM(S): 20 TABLET, DELAYED RELEASE ORAL at 06:05

## 2021-12-20 RX ADMIN — DONEPEZIL HYDROCHLORIDE 5 MILLIGRAM(S): 10 TABLET, FILM COATED ORAL at 22:21

## 2021-12-20 RX ADMIN — Medication 650 MILLIGRAM(S): at 12:00

## 2021-12-20 RX ADMIN — Medication 81 MILLIGRAM(S): at 11:26

## 2021-12-20 RX ADMIN — Medication 650 MILLIGRAM(S): at 11:28

## 2021-12-20 RX ADMIN — ENOXAPARIN SODIUM 60 MILLIGRAM(S): 100 INJECTION SUBCUTANEOUS at 06:06

## 2021-12-20 RX ADMIN — Medication 50 MILLIGRAM(S): at 11:27

## 2021-12-20 RX ADMIN — Medication 12.5 MILLIGRAM(S): at 06:05

## 2021-12-20 RX ADMIN — MEMANTINE HYDROCHLORIDE 5 MILLIGRAM(S): 10 TABLET ORAL at 11:26

## 2021-12-20 RX ADMIN — OLANZAPINE 2.5 MILLIGRAM(S): 15 TABLET, FILM COATED ORAL at 22:21

## 2021-12-20 RX ADMIN — ENOXAPARIN SODIUM 60 MILLIGRAM(S): 100 INJECTION SUBCUTANEOUS at 17:27

## 2021-12-20 RX ADMIN — Medication 12.5 MILLIGRAM(S): at 17:27

## 2021-12-20 RX ADMIN — REMDESIVIR 500 MILLIGRAM(S): 5 INJECTION INTRAVENOUS at 11:26

## 2021-12-20 RX ADMIN — AMLODIPINE BESYLATE 2.5 MILLIGRAM(S): 2.5 TABLET ORAL at 06:05

## 2021-12-20 NOTE — PROGRESS NOTE ADULT - PROBLEM SELECTOR PLAN 5
likely due to COvid  Alb: 2.2 g/dL / Pro: 6.9 g/dL / ALK PHOS: 84 U/L / ALT: 91 U/L DA / AST: 105 U/L / GGT: x         T bili 2  f/u direct and indirect bili  monitor lfts likely due to COvid  downtrending   monitor lfts  avoid hepatotoxins

## 2021-12-20 NOTE — PROGRESS NOTE ADULT - PROBLEM SELECTOR PLAN 2
Plan as above continue namenda + aricept   enhanced supervision   saftey precautions   start zyprexa at bedtime

## 2021-12-20 NOTE — PROGRESS NOTE ADULT - PROBLEM SELECTOR PLAN 7
patient from home    also covid +   day 3 remdesivir   requiring 3+L N/C  may need home 02?  will need PT eval for d/c planning

## 2021-12-20 NOTE — PROGRESS NOTE ADULT - PROBLEM SELECTOR PLAN 1
P/w o2 sat 60%, unvaccinated, tested positive 10 days ago  chest xray with bl patchy infiltrates  episode of hypoxia in 60's now requiring 6L pendant  elevated d dimer  C/w decadron and remdesivir  C/w monitor lfts while on remdesivir,   C/w albuterol, tylenol, cough medicine prn  F/u covid labs  C/w full dose Lovenox -p/w SOB and hypoxia due to multifocal pneumonia in setting of COVID 19  -cont Remdesivir 3/5  -cont Dexamethasone   -cont  with NC oxygen and monitor pulse oximetry frequently, titrate O2 down as tolerated   -obtain daily room air O2 saturations once O2 requirements stabilizes   -prone patient as tolerated   -cont prophylactic Lovenox   -cont supportive care   -daily labs CBC/CMP/CRP  -3 day labs ESR//D-dimer/LDH/Ferritin  -maintain on airborne and contact isolation   -consider need for extended prophylaxis prior to discharge based on D-Dimer and calculated VTE risk

## 2021-12-20 NOTE — PROGRESS NOTE ADULT - ASSESSMENT
Pt is a 75 y/o F with hx of HTN, HLD, Dementia? who presented to the ED with complaints of weakness and hypoxia. CXr significant for Extensive bilateral alveolar infiltrates. Pt had episode of hypoxia in 60's overnight with NRB mask, now saturating well 6L pendant. Admitted  for acute hypoxias respiratory failure due to Covid PNA, Pulm following    attempted to call pharmacy for med recs, Pharmacy closed today. Pt is a 77 y/o F from home with hx of HTN, HLD, and Dementia who presented to the ED with complaints of weakness and hypoxia. Found COVID +, CXr significant for Extensive bilateral alveolar infiltrates.  Admitted  for acute hypoxic respiratory failure due to Covid PNA. Requiring supplemental 02, started on remdesivir and decadron D3, Pulm following. Overnight patient with agitation pulling out IV, will plan to started zyprexa at bedtime.

## 2021-12-20 NOTE — PROGRESS NOTE ADULT - SUBJECTIVE AND OBJECTIVE BOX
Time of Visit:  Patient seen and examined.     MEDICATIONS  (STANDING):  amLODIPine   Tablet 2.5 milliGRAM(s) Oral daily  aspirin  chewable 81 milliGRAM(s) Oral daily  dexAMETHasone  Injectable 6 milliGRAM(s) IV Push daily  donepezil 5 milliGRAM(s) Oral at bedtime  enoxaparin Injectable 60 milliGRAM(s) SubCutaneous every 12 hours  influenza  Vaccine (HIGH DOSE) 0.7 milliLiter(s) IntraMuscular once  memantine 5 milliGRAM(s) Oral daily  metoprolol tartrate 12.5 milliGRAM(s) Oral two times a day  pantoprazole    Tablet 40 milliGRAM(s) Oral before breakfast  remdesivir  IVPB   IV Intermittent   remdesivir  IVPB 100 milliGRAM(s) IV Intermittent every 24 hours  sodium chloride 0.9%. 1000 milliLiter(s) (75 mL/Hr) IV Continuous <Continuous>  traZODone 50 milliGRAM(s) Oral daily      MEDICATIONS  (PRN):  acetaminophen     Tablet .. 650 milliGRAM(s) Oral every 6 hours PRN Temp greater or equal to 38C (100.4F), Mild Pain (1 - 3), Moderate Pain (4 - 6)  aluminum hydroxide/magnesium hydroxide/simethicone Suspension 30 milliLiter(s) Oral every 4 hours PRN Dyspepsia  benzonatate 100 milliGRAM(s) Oral three times a day PRN Cough  guaiFENesin Oral Liquid (Sugar-Free) 200 milliGRAM(s) Oral every 6 hours PRN Cough  ondansetron Injectable 4 milliGRAM(s) IV Push every 6 hours PRN Nausea and/or Vomiting       Medications up to date at time of exam.    ROS; No fever, chills, cough, congestion. Verbalized of mild SOB on exertion.    PHYSICAL EXAMINATION:  Vital Signs Last 24 Hrs  T(C): 36.1 (20 Dec 2021 11:57), Max: 36.8 (20 Dec 2021 06:02)  T(F): 97 (20 Dec 2021 11:57), Max: 98.2 (20 Dec 2021 06:02)  HR: 67 (20 Dec 2021 11:57) (62 - 67)  BP: 135/72 (20 Dec 2021 11:57) (135/72 - 141/80)  BP(mean): --  RR: 18 (20 Dec 2021 11:57) (18 - 20)  SpO2: 96% (20 Dec 2021 11:57) (77% - 97%)   (if applicable)    General: Alert and oriented. No acute distress.     HEENT: Head is normocephalic and atraumatic. No nasal tenderness. Extraocular muscles are intact. Mucous membranes are moist.     NECK: Supple, no palpable adenopathy.    LUNGS: Non labored. No wheezing. No use of accessory muscle .    HEART: S1 S2 Regular rate and no click/ rub.     ABDOMEN: Soft, nontender, and nondistended. Active bowel sounds.     EXTREMITIES: Without any cyanosis, clubbing, rash, lesions or edema.    NEUROLOGIC: Awake, alert, oriented.     SKIN: Warm and moist. Non diaphoretic.       LABS:                        12.0   15.24 )-----------( 308      ( 20 Dec 2021 07:17 )             35.1     12-20    141  |  109<H>  |  22<H>  ----------------------------<  91  3.5   |  24  |  0.73    Ca    8.3<L>      20 Dec 2021 07:17  Phos  2.8     12-20  Mg     2.4     12-20    TPro  5.6<L>  /  Alb  2.1<L>  /  TBili  0.7  /  DBili  x   /  AST  52<H>  /  ALT  52  /  AlkPhos  63  12-20              D-Dimer Assay, Quantitative: 1886 ng/mL DDU (12-20-21 @ 07:17)  D-Dimer Assay, Quantitative: 1869 ng/mL DDU (12-20-21 @ 07:17)        Procalcitonin, Serum: 0.10 ng/mL (12-20-21 @ 09:36)      MICROBIOLOGY: (if applicable)    RADIOLOGY & ADDITIONAL STUDIES:  EKG:   CXR:  ECHO:    IMPRESSION: 76y Female PAST MEDICAL & SURGICAL HISTORY:  No pertinent past medical history    HTN (hypertension)    HLD (hyperlipidemia)    No significant past surgical history    Impression; 77 Y/O Female Unvaccinated for Covid 19. Admitted for Acute Hypoxic Respiratory failure secondary to Covid 19 infection with B/L Pneumonia.      Suggestion :  O2 saturation 94% with O2 supplement 3L but using also 5L NC depending O2 saturation . Continue oxygen supplementation 3L-5L NC. Will monitor O2 saturation trend and increase demand of O2 supplement.   Isolation : contact and airborne.   Monitor biomarkers TIW .  Remdesivir daily x 5 days .  Decadron 6 mg /day x 10 doses then taper .  Monitor blood sugar with coverage .  DVT/ GI prophylactic.  Time of Visit:  Patient seen and examined.     MEDICATIONS  (STANDING):  amLODIPine   Tablet 2.5 milliGRAM(s) Oral daily  aspirin  chewable 81 milliGRAM(s) Oral daily  dexAMETHasone  Injectable 6 milliGRAM(s) IV Push daily  donepezil 5 milliGRAM(s) Oral at bedtime  enoxaparin Injectable 60 milliGRAM(s) SubCutaneous every 12 hours  influenza  Vaccine (HIGH DOSE) 0.7 milliLiter(s) IntraMuscular once  memantine 5 milliGRAM(s) Oral daily  metoprolol tartrate 12.5 milliGRAM(s) Oral two times a day  pantoprazole    Tablet 40 milliGRAM(s) Oral before breakfast  remdesivir  IVPB   IV Intermittent   remdesivir  IVPB 100 milliGRAM(s) IV Intermittent every 24 hours  sodium chloride 0.9%. 1000 milliLiter(s) (75 mL/Hr) IV Continuous <Continuous>  traZODone 50 milliGRAM(s) Oral daily      MEDICATIONS  (PRN):  acetaminophen     Tablet .. 650 milliGRAM(s) Oral every 6 hours PRN Temp greater or equal to 38C (100.4F), Mild Pain (1 - 3), Moderate Pain (4 - 6)  aluminum hydroxide/magnesium hydroxide/simethicone Suspension 30 milliLiter(s) Oral every 4 hours PRN Dyspepsia  benzonatate 100 milliGRAM(s) Oral three times a day PRN Cough  guaiFENesin Oral Liquid (Sugar-Free) 200 milliGRAM(s) Oral every 6 hours PRN Cough  ondansetron Injectable 4 milliGRAM(s) IV Push every 6 hours PRN Nausea and/or Vomiting       Medications up to date at time of exam.    ROS; No fever, chills, cough, congestion. Verbalized of mild SOB on exertion.    PHYSICAL EXAMINATION:  Vital Signs Last 24 Hrs  T(C): 36.1 (20 Dec 2021 11:57), Max: 36.8 (20 Dec 2021 06:02)  T(F): 97 (20 Dec 2021 11:57), Max: 98.2 (20 Dec 2021 06:02)  HR: 67 (20 Dec 2021 11:57) (62 - 67)  BP: 135/72 (20 Dec 2021 11:57) (135/72 - 141/80)  BP(mean): --  RR: 18 (20 Dec 2021 11:57) (18 - 20)  SpO2: 96% (20 Dec 2021 11:57) (77% - 97%)   (if applicable)    General: Alert and oriented. No acute distress.     HEENT: Head is normocephalic and atraumatic. No nasal tenderness. Extraocular muscles are intact. Mucous membranes are moist.     NECK: Supple, no palpable adenopathy.    LUNGS: Non labored. No wheezing. No use of accessory muscle .    HEART: S1 S2 Regular rate and no click/ rub.     ABDOMEN: Soft, nontender, and nondistended. Active bowel sounds.     EXTREMITIES: Without any cyanosis, clubbing, rash, lesions or edema.    NEUROLOGIC: Awake, alert, oriented.     SKIN: Warm and moist. Non diaphoretic.       LABS:                        12.0   15.24 )-----------( 308      ( 20 Dec 2021 07:17 )             35.1     12-20    141  |  109<H>  |  22<H>  ----------------------------<  91  3.5   |  24  |  0.73    Ca    8.3<L>      20 Dec 2021 07:17  Phos  2.8     12-20  Mg     2.4     12-20    TPro  5.6<L>  /  Alb  2.1<L>  /  TBili  0.7  /  DBili  x   /  AST  52<H>  /  ALT  52  /  AlkPhos  63  12-20              D-Dimer Assay, Quantitative: 1886 ng/mL DDU (12-20-21 @ 07:17)  D-Dimer Assay, Quantitative: 1869 ng/mL DDU (12-20-21 @ 07:17)        Procalcitonin, Serum: 0.10 ng/mL (12-20-21 @ 09:36)      MICROBIOLOGY: (if applicable)    RADIOLOGY & ADDITIONAL STUDIES:  EKG:   CXR:  ECHO:    IMPRESSION: 76y Female PAST MEDICAL & SURGICAL HISTORY:  No pertinent past medical history    HTN (hypertension)    HLD (hyperlipidemia)    No significant past surgical history    Impression; 77 Y/O Female Unvaccinated for Covid 19. Admitted for Acute Hypoxic Respiratory failure secondary to Covid 19 infection with B/L Pneumonia.      Suggestion :  O2 saturation 94% with O2 supplement 3L but using also 5L NC depending O2 saturation . Continue oxygen supplementation 3L-5L NC. Will monitor O2 saturation trend and increase demand of O2 supplement.   Isolation : contact and airborne.   Monitor biomarkers TIW .  Remdesivir daily x 5 days .  Decadron 6 mg /day x 10 doses then taper .  Monitor blood sugar with coverage .  DVT/ GI prophylactic.     Agree with above assessment and plan as transcribed.

## 2021-12-20 NOTE — PROGRESS NOTE ADULT - PROBLEM SELECTOR PLAN 3
Controlled,   C/w amlodipine and low dose metoprolol for now as per sure scripts  monitor bp  adjust meds as needed Controlled  's  C/w amlodipine and low dose metoprolol for now as per sure scripts  monitor bp  adjust meds as needed

## 2021-12-20 NOTE — PROGRESS NOTE ADULT - SUBJECTIVE AND OBJECTIVE BOX
NP Note discussed with  primary attending    Patient is a 76y old  Female who presents with a chief complaint of COVID (19 Dec 2021 18:28)      INTERVAL HPI/OVERNIGHT EVENTS: on enhanced supervision for shila, remains on supplemental 02 3L N/C    MEDICATIONS  (STANDING):  amLODIPine   Tablet 2.5 milliGRAM(s) Oral daily  aspirin  chewable 81 milliGRAM(s) Oral daily  dexAMETHasone  Injectable 6 milliGRAM(s) IV Push daily  donepezil 5 milliGRAM(s) Oral at bedtime  enoxaparin Injectable 60 milliGRAM(s) SubCutaneous every 12 hours  influenza  Vaccine (HIGH DOSE) 0.7 milliLiter(s) IntraMuscular once  memantine 5 milliGRAM(s) Oral daily  metoprolol tartrate 12.5 milliGRAM(s) Oral two times a day  pantoprazole    Tablet 40 milliGRAM(s) Oral before breakfast  remdesivir  IVPB   IV Intermittent   remdesivir  IVPB 100 milliGRAM(s) IV Intermittent every 24 hours  sodium chloride 0.9%. 1000 milliLiter(s) (75 mL/Hr) IV Continuous <Continuous>  traZODone 50 milliGRAM(s) Oral daily    MEDICATIONS  (PRN):  acetaminophen     Tablet .. 650 milliGRAM(s) Oral every 6 hours PRN Temp greater or equal to 38C (100.4F), Mild Pain (1 - 3), Moderate Pain (4 - 6)  aluminum hydroxide/magnesium hydroxide/simethicone Suspension 30 milliLiter(s) Oral every 4 hours PRN Dyspepsia  benzonatate 100 milliGRAM(s) Oral three times a day PRN Cough  guaiFENesin Oral Liquid (Sugar-Free) 200 milliGRAM(s) Oral every 6 hours PRN Cough  ondansetron Injectable 4 milliGRAM(s) IV Push every 6 hours PRN Nausea and/or Vomiting      __________________________________________________  REVIEW OF SYSTEMS:    CONSTITUTIONAL: No fever,   EYES: no acute visual disturbances  NECK: No pain or stiffness  RESPIRATORY: No cough; No shortness of breath  CARDIOVASCULAR: No chest pain, no palpitations  GASTROINTESTINAL: No pain. No nausea or vomiting; No diarrhea   NEUROLOGICAL: No headache or numbness, no tremors  MUSCULOSKELETAL: No joint pain, no muscle pain  GENITOURINARY: no dysuria, no frequency, no hesitancy  PSYCHIATRY: no depression , no anxiety  ALL OTHER  ROS negative        Vital Signs Last 24 Hrs  T(C): 36.8 (20 Dec 2021 06:02), Max: 36.8 (20 Dec 2021 06:02)  T(F): 98.2 (20 Dec 2021 06:02), Max: 98.2 (20 Dec 2021 06:02)  HR: 62 (20 Dec 2021 06:02) (62 - 66)  BP: 141/69 (20 Dec 2021 06:02) (133/68 - 141/80)  BP(mean): --  RR: 18 (20 Dec 2021 06:02) (18 - 21)  SpO2: 93% (20 Dec 2021 10:07) (77% - 97%)    ________________________________________________  PHYSICAL EXAM:  GENERAL: NAD  HEENT: Normocephalic;  conjunctivae and sclerae clear; moist mucous membranes;   NECK : supple  CHEST/LUNG: Clear to ausculitation bilaterally with good air entry   HEART: S1 S2  regular; no murmurs, gallops or rubs  ABDOMEN: Soft, Nontender, Nondistended; Bowel sounds present  EXTREMITIES: no cyanosis; no edema; no calf tenderness  SKIN: warm and dry; no rash  NERVOUS SYSTEM:  Awake and alert; Oriented  to place, person and time ; no new deficits    _________________________________________________  LABS:                        12.0   15.24 )-----------( 308      ( 20 Dec 2021 07:17 )             35.1     12-20    141  |  109<H>  |  22<H>  ----------------------------<  91  3.5   |  24  |  0.73    Ca    8.3<L>      20 Dec 2021 07:17  Phos  2.8     12-20  Mg     2.4     12-20    TPro  5.6<L>  /  Alb  2.1<L>  /  TBili  0.7  /  DBili  x   /  AST  52<H>  /  ALT  52  /  AlkPhos  63  12-20        CAPILLARY BLOOD GLUCOSE            RADIOLOGY & ADDITIONAL TESTS:    Imaging Personally Reviewed:  YES/NO    Consultant(s) Notes Reviewed:   YES/ No    Care Discussed with Consultants :     Plan of care was discussed with patient and /or primary care giver; all questions and concerns were addressed and care was aligned with patient's wishes.     NP Note discussed with  primary attending    Patient is a 76y old  Female who presents with a chief complaint of COVID (19 Dec 2021 18:28)      INTERVAL HPI/OVERNIGHT EVENTS: on enhanced supervision for shila, remains on supplemental 02 3L N/C    MEDICATIONS  (STANDING):  amLODIPine   Tablet 2.5 milliGRAM(s) Oral daily  aspirin  chewable 81 milliGRAM(s) Oral daily  dexAMETHasone  Injectable 6 milliGRAM(s) IV Push daily  donepezil 5 milliGRAM(s) Oral at bedtime  enoxaparin Injectable 60 milliGRAM(s) SubCutaneous every 12 hours  influenza  Vaccine (HIGH DOSE) 0.7 milliLiter(s) IntraMuscular once  memantine 5 milliGRAM(s) Oral daily  metoprolol tartrate 12.5 milliGRAM(s) Oral two times a day  pantoprazole    Tablet 40 milliGRAM(s) Oral before breakfast  remdesivir  IVPB   IV Intermittent   remdesivir  IVPB 100 milliGRAM(s) IV Intermittent every 24 hours  sodium chloride 0.9%. 1000 milliLiter(s) (75 mL/Hr) IV Continuous <Continuous>  traZODone 50 milliGRAM(s) Oral daily    MEDICATIONS  (PRN):  acetaminophen     Tablet .. 650 milliGRAM(s) Oral every 6 hours PRN Temp greater or equal to 38C (100.4F), Mild Pain (1 - 3), Moderate Pain (4 - 6)  aluminum hydroxide/magnesium hydroxide/simethicone Suspension 30 milliLiter(s) Oral every 4 hours PRN Dyspepsia  benzonatate 100 milliGRAM(s) Oral three times a day PRN Cough  guaiFENesin Oral Liquid (Sugar-Free) 200 milliGRAM(s) Oral every 6 hours PRN Cough  ondansetron Injectable 4 milliGRAM(s) IV Push every 6 hours PRN Nausea and/or Vomiting      __________________________________________________  REVIEW OF SYSTEMS:    CONSTITUTIONAL: No fever,   EYES: no acute visual disturbances  NECK: No pain or stiffness  RESPIRATORY: No cough; +shortness of breath  CARDIOVASCULAR: No chest pain, no palpitations  GASTROINTESTINAL: No pain. No nausea or vomiting; No diarrhea   NEUROLOGICAL: No headache or numbness, no tremors  MUSCULOSKELETAL: No joint pain, no muscle pain  GENITOURINARY: no dysuria, no frequency, no hesitancy  PSYCHIATRY: no depression , no anxiety  ALL OTHER  ROS negative        Vital Signs Last 24 Hrs  T(C): 36.8 (20 Dec 2021 06:02), Max: 36.8 (20 Dec 2021 06:02)  T(F): 98.2 (20 Dec 2021 06:02), Max: 98.2 (20 Dec 2021 06:02)  HR: 62 (20 Dec 2021 06:02) (62 - 66)  BP: 141/69 (20 Dec 2021 06:02) (133/68 - 141/80)  BP(mean): --  RR: 18 (20 Dec 2021 06:02) (18 - 21)  SpO2: 93% (20 Dec 2021 10:07) (77% - 97%)    ________________________________________________  PHYSICAL EXAM:  GENERAL: NAD  HEENT: Normocephalic; conjunctivae and sclerae clear  NECK : supple  CHEST/LUNG: diminished   HEART: S1 S2  regular; no murmurs, gallops or rubs  ABDOMEN: Soft, Nontender, Nondistended; Bowel sounds present  EXTREMITIES: no cyanosis; no edema; no calf tenderness  SKIN: warm and dry; no rash  NERVOUS SYSTEM:  Awake and alert; Oriented  to place, person and time  _________________________________________________  LABS:                        12.0   15.24 )-----------( 308      ( 20 Dec 2021 07:17 )             35.1     12-20    141  |  109<H>  |  22<H>  ----------------------------<  91  3.5   |  24  |  0.73    Ca    8.3<L>      20 Dec 2021 07:17  Phos  2.8     12-20  Mg     2.4     12-20    TPro  5.6<L>  /  Alb  2.1<L>  /  TBili  0.7  /  DBili  x   /  AST  52<H>  /  ALT  52  /  AlkPhos  63  12-20        CAPILLARY BLOOD GLUCOSE            RADIOLOGY & ADDITIONAL TESTS: < from: Xray Chest 1 View-PORTABLE IMMEDIATE (12.16.21 @ 23:40) >  ACC: [id number] EXAM:  XR CHEST PORTABLE IMMED 1V                          PROCEDURE DATE:  12/16/2021          INTERPRETATION:  Clinical history: 76-year-old female, shortness of   breath, cough and fever.    Portable view of the chest without comparison demonstrates a normal   cardiac silhouette and normal pulmonary vasculature with no pneumothorax   or acute osseous finding.    Extensive bilateral alveolar infiltrates.    IMPRESSION:  Extensive bilateral alveolar infiltrates, Covid 19 should be excluded    --- End of Report ---      < end of copied text >      Imaging Personally Reviewed:  YES    Consultant(s) Notes Reviewed:   YES    Plan of care was discussed with patient and /or primary care giver; all questions and concerns were addressed and care was aligned with patient's wishes.     You can access the FollowMyHealth Patient Portal offered by [facility] by registering at the following website: [email or web address]. By joining [facility]’s FollowMyHealth portal, you will also be able to view your health information using other applications (apps) compatible with our system.

## 2021-12-21 LAB
ALBUMIN SERPL ELPH-MCNC: 1.9 G/DL — LOW (ref 3.5–5)
ALP SERPL-CCNC: 61 U/L — SIGNIFICANT CHANGE UP (ref 40–120)
ALT FLD-CCNC: 66 U/L DA — HIGH (ref 10–60)
ANION GAP SERPL CALC-SCNC: 8 MMOL/L — SIGNIFICANT CHANGE UP (ref 5–17)
AST SERPL-CCNC: 75 U/L — HIGH (ref 10–40)
BILIRUB SERPL-MCNC: 0.7 MG/DL — SIGNIFICANT CHANGE UP (ref 0.2–1.2)
BUN SERPL-MCNC: 20 MG/DL — HIGH (ref 7–18)
CALCIUM SERPL-MCNC: 8.2 MG/DL — LOW (ref 8.4–10.5)
CHLORIDE SERPL-SCNC: 109 MMOL/L — HIGH (ref 96–108)
CO2 SERPL-SCNC: 24 MMOL/L — SIGNIFICANT CHANGE UP (ref 22–31)
CREAT SERPL-MCNC: 0.74 MG/DL — SIGNIFICANT CHANGE UP (ref 0.5–1.3)
GLUCOSE SERPL-MCNC: 101 MG/DL — HIGH (ref 70–99)
HCT VFR BLD CALC: 35.4 % — SIGNIFICANT CHANGE UP (ref 34.5–45)
HGB BLD-MCNC: 11.8 G/DL — SIGNIFICANT CHANGE UP (ref 11.5–15.5)
MCHC RBC-ENTMCNC: 28.6 PG — SIGNIFICANT CHANGE UP (ref 27–34)
MCHC RBC-ENTMCNC: 33.3 GM/DL — SIGNIFICANT CHANGE UP (ref 32–36)
MCV RBC AUTO: 85.7 FL — SIGNIFICANT CHANGE UP (ref 80–100)
NRBC # BLD: 0 /100 WBCS — SIGNIFICANT CHANGE UP (ref 0–0)
PLATELET # BLD AUTO: 308 K/UL — SIGNIFICANT CHANGE UP (ref 150–400)
POTASSIUM SERPL-MCNC: 4.1 MMOL/L — SIGNIFICANT CHANGE UP (ref 3.5–5.3)
POTASSIUM SERPL-SCNC: 4.1 MMOL/L — SIGNIFICANT CHANGE UP (ref 3.5–5.3)
PROT SERPL-MCNC: 5.7 G/DL — LOW (ref 6–8.3)
RBC # BLD: 4.13 M/UL — SIGNIFICANT CHANGE UP (ref 3.8–5.2)
RBC # FLD: 14.3 % — SIGNIFICANT CHANGE UP (ref 10.3–14.5)
SODIUM SERPL-SCNC: 141 MMOL/L — SIGNIFICANT CHANGE UP (ref 135–145)
WBC # BLD: 13.23 K/UL — HIGH (ref 3.8–10.5)
WBC # FLD AUTO: 13.23 K/UL — HIGH (ref 3.8–10.5)

## 2021-12-21 PROCEDURE — 99232 SBSQ HOSP IP/OBS MODERATE 35: CPT

## 2021-12-21 RX ADMIN — MEMANTINE HYDROCHLORIDE 5 MILLIGRAM(S): 10 TABLET ORAL at 12:50

## 2021-12-21 RX ADMIN — PANTOPRAZOLE SODIUM 40 MILLIGRAM(S): 20 TABLET, DELAYED RELEASE ORAL at 08:05

## 2021-12-21 RX ADMIN — REMDESIVIR 500 MILLIGRAM(S): 5 INJECTION INTRAVENOUS at 08:49

## 2021-12-21 RX ADMIN — OLANZAPINE 2.5 MILLIGRAM(S): 15 TABLET, FILM COATED ORAL at 21:51

## 2021-12-21 RX ADMIN — Medication 12.5 MILLIGRAM(S): at 17:08

## 2021-12-21 RX ADMIN — Medication 12.5 MILLIGRAM(S): at 05:48

## 2021-12-21 RX ADMIN — Medication 50 MILLIGRAM(S): at 12:50

## 2021-12-21 RX ADMIN — ENOXAPARIN SODIUM 60 MILLIGRAM(S): 100 INJECTION SUBCUTANEOUS at 05:48

## 2021-12-21 RX ADMIN — AMLODIPINE BESYLATE 2.5 MILLIGRAM(S): 2.5 TABLET ORAL at 05:48

## 2021-12-21 RX ADMIN — Medication 81 MILLIGRAM(S): at 12:50

## 2021-12-21 RX ADMIN — ENOXAPARIN SODIUM 60 MILLIGRAM(S): 100 INJECTION SUBCUTANEOUS at 17:09

## 2021-12-21 RX ADMIN — Medication 6 MILLIGRAM(S): at 05:49

## 2021-12-21 RX ADMIN — DONEPEZIL HYDROCHLORIDE 5 MILLIGRAM(S): 10 TABLET, FILM COATED ORAL at 21:52

## 2021-12-21 NOTE — PROGRESS NOTE ADULT - PROBLEM SELECTOR PLAN 2
continue namenda + aricept   enhanced supervision   saftey precautions   start zyprexa at bedtime continue namenda + aricept   enhanced supervision   saftey precautions   zyprexa at bedtime

## 2021-12-21 NOTE — PROGRESS NOTE ADULT - PROBLEM SELECTOR PLAN 5
likely due to COvid  downtrending   monitor lfts  avoid hepatotoxins likely multifactorial   monitor lfts daily   avoid hepatotoxins

## 2021-12-21 NOTE — PROGRESS NOTE ADULT - PROBLEM SELECTOR PLAN 1
-p/w SOB and hypoxia due to multifocal pneumonia in setting of COVID 19  -cont Remdesivir 3/5  -cont Dexamethasone   -cont  with NC oxygen and monitor pulse oximetry frequently, titrate O2 down as tolerated   -obtain daily room air O2 saturations once O2 requirements stabilizes   -prone patient as tolerated   -cont prophylactic Lovenox   -cont supportive care   -daily labs CBC/CMP/CRP  -3 day labs ESR//D-dimer/LDH/Ferritin  -maintain on airborne and contact isolation   -consider need for extended prophylaxis prior to discharge based on D-Dimer and calculated VTE risk -p/w SOB and hypoxia due to multifocal pneumonia in setting of COVID 19  -cont Remdesivir 4/5  -cont Dexamethasone   -cont  with NC oxygen and monitor pulse oximetry frequently, titrate O2 down as tolerated   -obtain daily room air O2 saturations once O2 requirements stabilizes   -prone patient as tolerated   -cont prophylactic Lovenox   -cont supportive care   -daily labs CBC/CMP/CRP  -3 day labs ESR//D-dimer/LDH/Ferritin  -maintain on airborne and contact isolation   -consider need for extended prophylaxis prior to discharge based on D-Dimer and calculated VTE risk

## 2021-12-21 NOTE — PROGRESS NOTE ADULT - PROBLEM SELECTOR PLAN 7
patient from home    also covid +   day 3 remdesivir   requiring 3+L N/C  may need home 02?  will need PT eval for d/c planning patient from home    also covid +   day 4 remdesivir   requiring 3+L N/C  may need home 02?  will need PT eval for d/c planning

## 2021-12-21 NOTE — PROGRESS NOTE ADULT - PROBLEM SELECTOR PLAN 3
Controlled  's  C/w amlodipine and low dose metoprolol for now as per sure scripts  monitor bp  adjust meds as needed

## 2021-12-21 NOTE — PROGRESS NOTE ADULT - SUBJECTIVE AND OBJECTIVE BOX
-*-* INCOMPLETE  NP Note discussed with  primary attending    Patient is a 76y old  Female who presents with a chief complaint of COVID (21 Dec 2021 10:05)      INTERVAL HPI/OVERNIGHT EVENTS: supplemental 02 weaned down 2L and maintaining 02sat above 92%      MEDICATIONS  (STANDING):  amLODIPine   Tablet 2.5 milliGRAM(s) Oral daily  aspirin  chewable 81 milliGRAM(s) Oral daily  dexAMETHasone  Injectable 6 milliGRAM(s) IV Push daily  donepezil 5 milliGRAM(s) Oral at bedtime  enoxaparin Injectable 60 milliGRAM(s) SubCutaneous every 12 hours  influenza  Vaccine (HIGH DOSE) 0.7 milliLiter(s) IntraMuscular once  memantine 5 milliGRAM(s) Oral daily  metoprolol tartrate 12.5 milliGRAM(s) Oral two times a day  OLANZapine 2.5 milliGRAM(s) Oral at bedtime  pantoprazole    Tablet 40 milliGRAM(s) Oral before breakfast  sodium chloride 0.9%. 1000 milliLiter(s) (75 mL/Hr) IV Continuous <Continuous>  traZODone 50 milliGRAM(s) Oral daily    MEDICATIONS  (PRN):  acetaminophen     Tablet .. 650 milliGRAM(s) Oral every 6 hours PRN Temp greater or equal to 38C (100.4F), Mild Pain (1 - 3), Moderate Pain (4 - 6)  aluminum hydroxide/magnesium hydroxide/simethicone Suspension 30 milliLiter(s) Oral every 4 hours PRN Dyspepsia  benzonatate 100 milliGRAM(s) Oral three times a day PRN Cough  guaiFENesin Oral Liquid (Sugar-Free) 200 milliGRAM(s) Oral every 6 hours PRN Cough  ondansetron Injectable 4 milliGRAM(s) IV Push every 6 hours PRN Nausea and/or Vomiting      __________________________________________________  REVIEW OF SYSTEMS:    CONSTITUTIONAL: No fever,   EYES: no acute visual disturbances  NECK: No pain or stiffness  RESPIRATORY: No cough; +THAKUR  CARDIOVASCULAR: No chest pain, no palpitations  GASTROINTESTINAL: No pain. No nausea or vomiting; No diarrhea   NEUROLOGICAL: No headache or numbness, no tremors  MUSCULOSKELETAL: No joint pain, no muscle pain  GENITOURINARY: no dysuria, no frequency, no hesitancy  PSYCHIATRY: no depression , no anxiety  ALL OTHER  ROS negative        Vital Signs Last 24 Hrs  T(C): --  T(F): --  HR: 66 (21 Dec 2021 05:54) (63 - 66)  BP: 135/63 (21 Dec 2021 05:54) (107/56 - 135/63)  BP(mean): --  RR: 18 (21 Dec 2021 05:54) (18 - 20)  SpO2: 99% (21 Dec 2021 05:54) (97% - 99%)    ________________________________________________  PHYSICAL EXAM:  GENERAL: NAD  HEENT: Normocephalic; conjunctivae and sclerae clear;   NECK : supple  CHEST/LUNG: Clear to auscultation bilaterally with good air entry   HEART: S1 S2  regular; no murmurs, gallops or rubs  ABDOMEN: Soft, Nontender, Nondistended; Bowel sounds present  EXTREMITIES: no cyanosis; no edema; no calf tenderness  SKIN: warm and dry; no rash  NERVOUS SYSTEM:  Awake and alert; Oriented  to place, person and time  _________________________________________________  LABS:                        11.8   13.23 )-----------( 308      ( 21 Dec 2021 09:17 )             35.4     12-21    141  |  109<H>  |  20<H>  ----------------------------<  101<H>  4.1   |  24  |  0.74    Ca    8.2<L>      21 Dec 2021 09:17  Phos  2.8     12-20  Mg     2.4     12-20    TPro  5.7<L>  /  Alb  1.9<L>  /  TBili  0.7  /  DBili  x   /  AST  75<H>  /  ALT  66<H>  /  AlkPhos  61  12-21        CAPILLARY BLOOD GLUCOSE            RADIOLOGY & ADDITIONAL TESTS: < from: Xray Chest 1 View-PORTABLE IMMEDIATE (12.16.21 @ 23:40) >    ACC: [id number] EXAM:  XR CHEST PORTABLE IMMED 1V                          PROCEDURE DATE:  12/16/2021          INTERPRETATION:  Clinical history: 76-year-old female, shortness of   breath, cough and fever.    Portable view of the chest without comparison demonstrates a normal   cardiac silhouette and normal pulmonary vasculature with no pneumothorax   or acute osseous finding.    Extensive bilateral alveolar infiltrates.    IMPRESSION:  Extensive bilateral alveolar infiltrates, Covid 19 should be excluded    --- End of Report ---      < end of copied text >      Imaging Personally Reviewed:  YES    Consultant(s) Notes Reviewed:   YES    Plan of care was discussed with patient and /or primary care giver; all questions and concerns were addressed and care was aligned with patient's wishes.     NP Note discussed with  primary attending    Patient is a 76y old  Female who presents with a chief complaint of COVID (21 Dec 2021 10:05)      INTERVAL HPI/OVERNIGHT EVENTS: supplemental 02 weaned down 2L and maintaining 02sat above 92%      MEDICATIONS  (STANDING):  amLODIPine   Tablet 2.5 milliGRAM(s) Oral daily  aspirin  chewable 81 milliGRAM(s) Oral daily  dexAMETHasone  Injectable 6 milliGRAM(s) IV Push daily  donepezil 5 milliGRAM(s) Oral at bedtime  enoxaparin Injectable 60 milliGRAM(s) SubCutaneous every 12 hours  influenza  Vaccine (HIGH DOSE) 0.7 milliLiter(s) IntraMuscular once  memantine 5 milliGRAM(s) Oral daily  metoprolol tartrate 12.5 milliGRAM(s) Oral two times a day  OLANZapine 2.5 milliGRAM(s) Oral at bedtime  pantoprazole    Tablet 40 milliGRAM(s) Oral before breakfast  sodium chloride 0.9%. 1000 milliLiter(s) (75 mL/Hr) IV Continuous <Continuous>  traZODone 50 milliGRAM(s) Oral daily    MEDICATIONS  (PRN):  acetaminophen     Tablet .. 650 milliGRAM(s) Oral every 6 hours PRN Temp greater or equal to 38C (100.4F), Mild Pain (1 - 3), Moderate Pain (4 - 6)  aluminum hydroxide/magnesium hydroxide/simethicone Suspension 30 milliLiter(s) Oral every 4 hours PRN Dyspepsia  benzonatate 100 milliGRAM(s) Oral three times a day PRN Cough  guaiFENesin Oral Liquid (Sugar-Free) 200 milliGRAM(s) Oral every 6 hours PRN Cough  ondansetron Injectable 4 milliGRAM(s) IV Push every 6 hours PRN Nausea and/or Vomiting      __________________________________________________  REVIEW OF SYSTEMS:    CONSTITUTIONAL: No fever,   EYES: no acute visual disturbances  NECK: No pain or stiffness  RESPIRATORY: No cough; +THAKUR  CARDIOVASCULAR: No chest pain, no palpitations  GASTROINTESTINAL: No pain. No nausea or vomiting; No diarrhea   NEUROLOGICAL: No headache or numbness, no tremors  MUSCULOSKELETAL: No joint pain, no muscle pain  GENITOURINARY: no dysuria, no frequency, no hesitancy  PSYCHIATRY: no depression , no anxiety  ALL OTHER  ROS negative        Vital Signs Last 24 Hrs  T(C): --  T(F): --  HR: 66 (21 Dec 2021 05:54) (63 - 66)  BP: 135/63 (21 Dec 2021 05:54) (107/56 - 135/63)  BP(mean): --  RR: 18 (21 Dec 2021 05:54) (18 - 20)  SpO2: 99% (21 Dec 2021 05:54) (97% - 99%)    ________________________________________________  PHYSICAL EXAM:  GENERAL: NAD  HEENT: Normocephalic; conjunctivae and sclerae clear;   NECK : supple  CHEST/LUNG: rales B/L   HEART: S1 S2  regular; no murmurs, gallops or rubs  ABDOMEN: Soft, Nontender, Nondistended; Bowel sounds present  EXTREMITIES: no cyanosis; no edema; no calf tenderness  SKIN: warm and dry; no rash  NERVOUS SYSTEM:  Awake and alert; Oriented  to place, person and time  _________________________________________________  LABS:                        11.8   13.23 )-----------( 308      ( 21 Dec 2021 09:17 )             35.4     12-21    141  |  109<H>  |  20<H>  ----------------------------<  101<H>  4.1   |  24  |  0.74    Ca    8.2<L>      21 Dec 2021 09:17  Phos  2.8     12-20  Mg     2.4     12-20    TPro  5.7<L>  /  Alb  1.9<L>  /  TBili  0.7  /  DBili  x   /  AST  75<H>  /  ALT  66<H>  /  AlkPhos  61  12-21        CAPILLARY BLOOD GLUCOSE            RADIOLOGY & ADDITIONAL TESTS: < from: Xray Chest 1 View-PORTABLE IMMEDIATE (12.16.21 @ 23:40) >    ACC: [id number] EXAM:  XR CHEST PORTABLE IMMED 1V                          PROCEDURE DATE:  12/16/2021          INTERPRETATION:  Clinical history: 76-year-old female, shortness of   breath, cough and fever.    Portable view of the chest without comparison demonstrates a normal   cardiac silhouette and normal pulmonary vasculature with no pneumothorax   or acute osseous finding.    Extensive bilateral alveolar infiltrates.    IMPRESSION:  Extensive bilateral alveolar infiltrates, Covid 19 should be excluded    --- End of Report ---      < end of copied text >      Imaging Personally Reviewed:  YES    Consultant(s) Notes Reviewed:   YES    Plan of care was discussed with patient and /or primary care giver; all questions and concerns were addressed and care was aligned with patient's wishes.

## 2021-12-21 NOTE — PROGRESS NOTE ADULT - ASSESSMENT
Pt is a 75 y/o F from home with hx of HTN, HLD, and Dementia who presented to the ED with complaints of weakness and hypoxia. Found COVID +, CXr significant for Extensive bilateral alveolar infiltrates.  Admitted  for acute hypoxic respiratory failure due to Covid PNA. Requiring supplemental 02, started on remdesivir and decadron D3, Pulm following. Overnight patient with agitation pulling out IV, will plan to started zyprexa at bedtime.  Pt is a 75 y/o F from home with hx of HTN, HLD, and Dementia who presented to the ED with complaints of weakness and hypoxia. Found COVID +, CXr confirmed significant Extensive bilateral alveolar infiltrates. Admitted to medicine for acute hypoxic respiratory failure due to COVID PNA. Requiring supplemental 02, started on remdesivir and decadron D4, Pulm [facility] following. Hospital course complicated by worsening dementia and agitation, started on Zyprexa at bedtime. Pending PT eval for d/c planning, weaning off 02 as tolerated- Eval for need for home 02

## 2021-12-21 NOTE — PROGRESS NOTE ADULT - SUBJECTIVE AND OBJECTIVE BOX
Time of Visit:  Patient seen and examined.     MEDICATIONS  (STANDING):  amLODIPine   Tablet 2.5 milliGRAM(s) Oral daily  aspirin  chewable 81 milliGRAM(s) Oral daily  dexAMETHasone  Injectable 6 milliGRAM(s) IV Push daily  donepezil 5 milliGRAM(s) Oral at bedtime  enoxaparin Injectable 60 milliGRAM(s) SubCutaneous every 12 hours  influenza  Vaccine (HIGH DOSE) 0.7 milliLiter(s) IntraMuscular once  memantine 5 milliGRAM(s) Oral daily  metoprolol tartrate 12.5 milliGRAM(s) Oral two times a day  OLANZapine 2.5 milliGRAM(s) Oral at bedtime  pantoprazole    Tablet 40 milliGRAM(s) Oral before breakfast  sodium chloride 0.9%. 1000 milliLiter(s) (75 mL/Hr) IV Continuous <Continuous>  traZODone 50 milliGRAM(s) Oral daily      MEDICATIONS  (PRN):  acetaminophen     Tablet .. 650 milliGRAM(s) Oral every 6 hours PRN Temp greater or equal to 38C (100.4F), Mild Pain (1 - 3), Moderate Pain (4 - 6)  aluminum hydroxide/magnesium hydroxide/simethicone Suspension 30 milliLiter(s) Oral every 4 hours PRN Dyspepsia  benzonatate 100 milliGRAM(s) Oral three times a day PRN Cough  guaiFENesin Oral Liquid (Sugar-Free) 200 milliGRAM(s) Oral every 6 hours PRN Cough  ondansetron Injectable 4 milliGRAM(s) IV Push every 6 hours PRN Nausea and/or Vomiting       Medications up to date at time of exam.      PHYSICAL EXAMINATION:  Patient has no new complaints.  GENERAL: The patient is a well-developed, well-nourished, in no apparent distress.     Vital Signs Last 24 Hrs  T(C): 36.7 (21 Dec 2021 14:00), Max: 36.7 (21 Dec 2021 14:00)  T(F): 98 (21 Dec 2021 14:00), Max: 98 (21 Dec 2021 14:00)  HR: 75 (21 Dec 2021 14:00) (63 - 75)  BP: 118/65 (21 Dec 2021 14:00) (107/56 - 135/63)  BP(mean): --  RR: 18 (21 Dec 2021 14:00) (18 - 20)  SpO2: 94% (21 Dec 2021 15:47) (94% - 99%)   (if applicable)    Chest Tube (if applicable)    HEENT: Head is normocephalic and atraumatic. Extraocular muscles are intact. Mucous membranes are moist.     NECK: Supple, no palpable adenopathy.    LUNGS: Clear to auscultation, no wheezing, rales, or rhonchi.    HEART: Regular rate and rhythm without murmur.    ABDOMEN: Soft, nontender, and nondistended.  No hepatosplenomegaly is noted.    : No painful voiding, no pelvic pain    EXTREMITIES: Without any cyanosis, clubbing, rash, lesions or edema.    NEUROLOGIC: Awake, alert, oriented, grossly intact    SKIN: Warm, dry, good turgor.      LABS:                        11.8   13.23 )-----------( 308      ( 21 Dec 2021 09:17 )             35.4     12-21    141  |  109<H>  |  20<H>  ----------------------------<  101<H>  4.1   |  24  |  0.74    Ca    8.2<L>      21 Dec 2021 09:17  Phos  2.8     12-20  Mg     2.4     12-20    TPro  5.7<L>  /  Alb  1.9<L>  /  TBili  0.7  /  DBili  x   /  AST  75<H>  /  ALT  66<H>  /  AlkPhos  61  12-21                    Procalcitonin, Serum: 0.10 ng/mL (12-20-21 @ 09:36)      MICROBIOLOGY: (if applicable)    RADIOLOGY & ADDITIONAL STUDIES:  EKG:   CXR:  ECHO:    IMPRESSION: 76y Female PAST MEDICAL & SURGICAL HISTORY:  No pertinent past medical history    HTN (hypertension)    HLD (hyperlipidemia)    No significant past surgical history     p/w         Impression; 77 Y/O Female Unvaccinated for Covid 19. Admitted for Acute Hypoxic Respiratory failure secondary to Covid 19 infection with B/L Pneumonia.      Suggestion :  - at is requiring less FiO2 , continue to taper   - Isolation : contact and airborne.   - Monitor biomarkers TIW .  - s/p Remdesivir daily x 5 days .  - Decadron 6 mg /day x 10 doses then taper  . may change to PO upon discharge   - Monitor blood sugar with coverage .  - DVT/ GI prophylactic.   - Out pat pulmo f/u   - Incentive spirometry

## 2021-12-22 DIAGNOSIS — J18.9 PNEUMONIA, UNSPECIFIED ORGANISM: ICD-10-CM

## 2021-12-22 LAB
ALBUMIN SERPL ELPH-MCNC: 2 G/DL — LOW (ref 3.5–5)
ALP SERPL-CCNC: 60 U/L — SIGNIFICANT CHANGE UP (ref 40–120)
ALT FLD-CCNC: 59 U/L DA — SIGNIFICANT CHANGE UP (ref 10–60)
ANION GAP SERPL CALC-SCNC: 6 MMOL/L — SIGNIFICANT CHANGE UP (ref 5–17)
AST SERPL-CCNC: 46 U/L — HIGH (ref 10–40)
BILIRUB SERPL-MCNC: 0.6 MG/DL — SIGNIFICANT CHANGE UP (ref 0.2–1.2)
BUN SERPL-MCNC: 22 MG/DL — HIGH (ref 7–18)
CALCIUM SERPL-MCNC: 8.8 MG/DL — SIGNIFICANT CHANGE UP (ref 8.4–10.5)
CHLORIDE SERPL-SCNC: 108 MMOL/L — SIGNIFICANT CHANGE UP (ref 96–108)
CO2 SERPL-SCNC: 27 MMOL/L — SIGNIFICANT CHANGE UP (ref 22–31)
CREAT SERPL-MCNC: 0.78 MG/DL — SIGNIFICANT CHANGE UP (ref 0.5–1.3)
GLUCOSE SERPL-MCNC: 106 MG/DL — HIGH (ref 70–99)
HCT VFR BLD CALC: 35.3 % — SIGNIFICANT CHANGE UP (ref 34.5–45)
HGB BLD-MCNC: 12 G/DL — SIGNIFICANT CHANGE UP (ref 11.5–15.5)
MCHC RBC-ENTMCNC: 29.2 PG — SIGNIFICANT CHANGE UP (ref 27–34)
MCHC RBC-ENTMCNC: 34 GM/DL — SIGNIFICANT CHANGE UP (ref 32–36)
MCV RBC AUTO: 85.9 FL — SIGNIFICANT CHANGE UP (ref 80–100)
NRBC # BLD: 0 /100 WBCS — SIGNIFICANT CHANGE UP (ref 0–0)
PLATELET # BLD AUTO: 279 K/UL — SIGNIFICANT CHANGE UP (ref 150–400)
POTASSIUM SERPL-MCNC: 4.2 MMOL/L — SIGNIFICANT CHANGE UP (ref 3.5–5.3)
POTASSIUM SERPL-SCNC: 4.2 MMOL/L — SIGNIFICANT CHANGE UP (ref 3.5–5.3)
PROT SERPL-MCNC: 5.8 G/DL — LOW (ref 6–8.3)
RBC # BLD: 4.11 M/UL — SIGNIFICANT CHANGE UP (ref 3.8–5.2)
RBC # FLD: 14.7 % — HIGH (ref 10.3–14.5)
SODIUM SERPL-SCNC: 141 MMOL/L — SIGNIFICANT CHANGE UP (ref 135–145)
WBC # BLD: 8.49 K/UL — SIGNIFICANT CHANGE UP (ref 3.8–10.5)
WBC # FLD AUTO: 8.49 K/UL — SIGNIFICANT CHANGE UP (ref 3.8–10.5)

## 2021-12-22 PROCEDURE — 99232 SBSQ HOSP IP/OBS MODERATE 35: CPT

## 2021-12-22 RX ORDER — RIVAROXABAN 15 MG-20MG
1 KIT ORAL
Qty: 30 | Refills: 0
Start: 2021-12-22 | End: 2022-01-20

## 2021-12-22 RX ORDER — OLANZAPINE 15 MG/1
1 TABLET, FILM COATED ORAL
Qty: 14 | Refills: 0
Start: 2021-12-22 | End: 2022-01-04

## 2021-12-22 RX ORDER — ICOSAPENT ETHYL 500 MG/1
2 CAPSULE, LIQUID FILLED ORAL
Qty: 0 | Refills: 0 | DISCHARGE

## 2021-12-22 RX ORDER — METOPROLOL TARTRATE 50 MG
1 TABLET ORAL
Qty: 0 | Refills: 0 | DISCHARGE

## 2021-12-22 RX ORDER — DEXLANSOPRAZOLE 30 MG/1
1 CAPSULE, DELAYED RELEASE ORAL
Qty: 0 | Refills: 0 | DISCHARGE

## 2021-12-22 RX ORDER — MEMANTINE HYDROCHLORIDE 10 MG/1
1 TABLET ORAL
Qty: 0 | Refills: 0 | DISCHARGE

## 2021-12-22 RX ORDER — DEXAMETHASONE 0.5 MG/5ML
1 ELIXIR ORAL
Qty: 5 | Refills: 0
Start: 2021-12-22 | End: 2021-12-26

## 2021-12-22 RX ORDER — ASPIRIN/CALCIUM CARB/MAGNESIUM 324 MG
1 TABLET ORAL
Qty: 0 | Refills: 0 | DISCHARGE

## 2021-12-22 RX ORDER — ACETAMINOPHEN 500 MG
2 TABLET ORAL
Qty: 0 | Refills: 0 | DISCHARGE
Start: 2021-12-22

## 2021-12-22 RX ORDER — MEMANTINE HYDROCHLORIDE 10 MG/1
1 TABLET ORAL
Qty: 0 | Refills: 0 | DISCHARGE
Start: 2021-12-22

## 2021-12-22 RX ADMIN — ENOXAPARIN SODIUM 60 MILLIGRAM(S): 100 INJECTION SUBCUTANEOUS at 18:56

## 2021-12-22 RX ADMIN — Medication 50 MILLIGRAM(S): at 13:19

## 2021-12-22 RX ADMIN — PANTOPRAZOLE SODIUM 40 MILLIGRAM(S): 20 TABLET, DELAYED RELEASE ORAL at 06:30

## 2021-12-22 RX ADMIN — OLANZAPINE 2.5 MILLIGRAM(S): 15 TABLET, FILM COATED ORAL at 21:42

## 2021-12-22 RX ADMIN — Medication 12.5 MILLIGRAM(S): at 18:56

## 2021-12-22 RX ADMIN — AMLODIPINE BESYLATE 2.5 MILLIGRAM(S): 2.5 TABLET ORAL at 06:30

## 2021-12-22 RX ADMIN — Medication 81 MILLIGRAM(S): at 13:19

## 2021-12-22 RX ADMIN — ENOXAPARIN SODIUM 60 MILLIGRAM(S): 100 INJECTION SUBCUTANEOUS at 06:30

## 2021-12-22 RX ADMIN — Medication 12.5 MILLIGRAM(S): at 06:29

## 2021-12-22 RX ADMIN — Medication 6 MILLIGRAM(S): at 06:30

## 2021-12-22 RX ADMIN — DONEPEZIL HYDROCHLORIDE 5 MILLIGRAM(S): 10 TABLET, FILM COATED ORAL at 21:42

## 2021-12-22 RX ADMIN — MEMANTINE HYDROCHLORIDE 5 MILLIGRAM(S): 10 TABLET ORAL at 13:14

## 2021-12-22 NOTE — PROGRESS NOTE ADULT - SUBJECTIVE AND OBJECTIVE BOX
Patient is a 76y old  Female who presents with a chief complaint of COVID (21 Dec 2021 19:02)    OVERNIGHT EVENTS: no acute events overnight, sitting up in bed eating breakfast       REVIEW OF SYSTEMS:  CONSTITUTIONAL: No fever, chills  NECK: No pain or stiffness  RESPIRATORY: +ve cough, no SOB  CARDIOVASCULAR: No chest pain, palpitations  GASTROINTESTINAL: No abdominal pain. No nausea, vomiting, or diarrhea  GENITOURINARY: No dysuria  NEUROLOGICAL: No HA  MUSCULOSKELETAL: No joint pain or swelling; No muscle, back, or extremity pain    T(C): 36.4 (12-22-21 @ 06:36), Max: 36.7 (12-21-21 @ 14:00)  HR: 57 (12-22-21 @ 06:36) (57 - 75)  BP: 135/66 (12-22-21 @ 06:36) (118/65 - 135/66)  RR: 17 (12-22-21 @ 06:36) (16 - 18)  SpO2: 92% (12-22-21 @ 10:53) (71% - 97%)  Wt(kg): --Vital Signs Last 24 Hrs  T(C): 36.4 (22 Dec 2021 06:36), Max: 36.7 (21 Dec 2021 14:00)  T(F): 97.6 (22 Dec 2021 06:36), Max: 98 (21 Dec 2021 14:00)  HR: 57 (22 Dec 2021 06:36) (57 - 75)  BP: 135/66 (22 Dec 2021 06:36) (118/65 - 135/66)  BP(mean): --  RR: 17 (22 Dec 2021 06:36) (16 - 18)  SpO2: 92% (22 Dec 2021 10:53) (71% - 97%)    MEDICATIONS  (STANDING):  amLODIPine   Tablet 2.5 milliGRAM(s) Oral daily  aspirin  chewable 81 milliGRAM(s) Oral daily  dexAMETHasone  Injectable 6 milliGRAM(s) IV Push daily  donepezil 5 milliGRAM(s) Oral at bedtime  enoxaparin Injectable 60 milliGRAM(s) SubCutaneous every 12 hours  influenza  Vaccine (HIGH DOSE) 0.7 milliLiter(s) IntraMuscular once  memantine 5 milliGRAM(s) Oral daily  metoprolol tartrate 12.5 milliGRAM(s) Oral two times a day  OLANZapine 2.5 milliGRAM(s) Oral at bedtime  pantoprazole    Tablet 40 milliGRAM(s) Oral before breakfast  sodium chloride 0.9%. 1000 milliLiter(s) (75 mL/Hr) IV Continuous <Continuous>  traZODone 50 milliGRAM(s) Oral daily    MEDICATIONS  (PRN):  acetaminophen     Tablet .. 650 milliGRAM(s) Oral every 6 hours PRN Temp greater or equal to 38C (100.4F), Mild Pain (1 - 3), Moderate Pain (4 - 6)  aluminum hydroxide/magnesium hydroxide/simethicone Suspension 30 milliLiter(s) Oral every 4 hours PRN Dyspepsia  benzonatate 100 milliGRAM(s) Oral three times a day PRN Cough  guaiFENesin Oral Liquid (Sugar-Free) 200 milliGRAM(s) Oral every 6 hours PRN Cough  ondansetron Injectable 4 milliGRAM(s) IV Push every 6 hours PRN Nausea and/or Vomiting      PHYSICAL EXAM:  GENERAL: NAD  EYES: clear conjunctiva  ENMT: Moist mucous membranes  NECK: Supple, No JVD  CHEST/LUNG: +ve diffuse rhonchi   HEART: S1, S2, Regular rate and rhythm  ABDOMEN: Soft, Nontender, Nondistended; Bowel sounds present  NEURO: Alert & Oriented X3  EXTREMITIES: No LE edema, no calf tenderness    Consultant(s) Notes Reviewed:  [x ] YES  [ ] NO  Care Discussed with Consultants/Other Providers [ x] YES  [ ] NO    LABS:                        12.0   8.49  )-----------( 279      ( 22 Dec 2021 07:17 )             35.3     12-22    141  |  108  |  22<H>  ----------------------------<  106<H>  4.2   |  27  |  0.78    Ca    8.8      22 Dec 2021 07:17    TPro  5.8<L>  /  Alb  2.0<L>  /  TBili  0.6  /  DBili  x   /  AST  46<H>  /  ALT  59  /  AlkPhos  60  12-22    CAPILLARY BLOOD GLUCOSE  RADIOLOGY & ADDITIONAL TESTS:    < from: Xray Chest 1 View-PORTABLE IMMEDIATE (12.16.21 @ 23:40) >    ACC: [id number] EXAM:  XR CHEST PORTABLE IMMED 1V                          PROCEDURE DATE:  12/16/2021          INTERPRETATION:  Clinical history: 76-year-old female, shortness of   breath, cough and fever.    Portable view of the chest without comparison demonstrates a normal   cardiac silhouette and normal pulmonary vasculature with no pneumothorax   or acute osseous finding.    Extensive bilateral alveolar infiltrates.    IMPRESSION:  Extensive bilateral alveolar infiltrates, Covid 19 should be excluded      < end of copied text >      Imaging Personally Reviewed:  [ ] YES  [ ] NO

## 2021-12-22 NOTE — PROGRESS NOTE ADULT - PROBLEM SELECTOR PLAN 6
Lovenox for dvt ppx  protonix  for gi ppx
Lovenox for dvt ppx  protonix  for gi ppx
-likely in setting of acute viral illness, now improving   -mon LFTs
Lovenox for dvt ppx  protonix  for gi ppx
Lovenox for dvt ppx  protonix  for gi ppx

## 2021-12-22 NOTE — PROGRESS NOTE ADULT - PROBLEM SELECTOR PROBLEM 1
Acute respiratory failure due to COVID-19

## 2021-12-22 NOTE — PROGRESS NOTE ADULT - PROBLEM SELECTOR PROBLEM 4
HLD (hyperlipidemia)
HTN (hypertension)

## 2021-12-22 NOTE — CHART NOTE - NSCHARTNOTEFT_GEN_A_CORE
Pt for d/c home tomorrow if home O2 is set up   Spoke to pt's daughter Chiquita Means [phone], hospital course, pt's current clinical status, treatment plan/options and discharge plan/options discussed at length   Discharge instructions, outpt follow up and medication indications including Xarelto reviewed at length with pt's daughter with good understanding   Primary team to follow up on oxygen delivery tomorrow     Above d/w Dr. Hartmann

## 2021-12-22 NOTE — PROGRESS NOTE ADULT - SUBJECTIVE AND OBJECTIVE BOX
Time of Visit:  Patient seen and examined.     MEDICATIONS  (STANDING):  amLODIPine   Tablet 2.5 milliGRAM(s) Oral daily  aspirin  chewable 81 milliGRAM(s) Oral daily  dexAMETHasone  Injectable 6 milliGRAM(s) IV Push daily  donepezil 5 milliGRAM(s) Oral at bedtime  enoxaparin Injectable 60 milliGRAM(s) SubCutaneous every 12 hours  influenza  Vaccine (HIGH DOSE) 0.7 milliLiter(s) IntraMuscular once  memantine 5 milliGRAM(s) Oral daily  metoprolol tartrate 12.5 milliGRAM(s) Oral two times a day  OLANZapine 2.5 milliGRAM(s) Oral at bedtime  pantoprazole    Tablet 40 milliGRAM(s) Oral before breakfast  sodium chloride 0.9%. 1000 milliLiter(s) (75 mL/Hr) IV Continuous <Continuous>  traZODone 50 milliGRAM(s) Oral daily      MEDICATIONS  (PRN):  acetaminophen     Tablet .. 650 milliGRAM(s) Oral every 6 hours PRN Temp greater or equal to 38C (100.4F), Mild Pain (1 - 3), Moderate Pain (4 - 6)  aluminum hydroxide/magnesium hydroxide/simethicone Suspension 30 milliLiter(s) Oral every 4 hours PRN Dyspepsia  benzonatate 100 milliGRAM(s) Oral three times a day PRN Cough  guaiFENesin Oral Liquid (Sugar-Free) 200 milliGRAM(s) Oral every 6 hours PRN Cough  ondansetron Injectable 4 milliGRAM(s) IV Push every 6 hours PRN Nausea and/or Vomiting       Medications up to date at time of exam.      PHYSICAL EXAMINATION:  Patient has no new complaints.  GENERAL: The patient is a well-developed, well-nourished, in no apparent distress.     Vital Signs Last 24 Hrs  T(C): 36.7 (22 Dec 2021 14:17), Max: 36.7 (22 Dec 2021 14:17)  T(F): 98 (22 Dec 2021 14:17), Max: 98 (22 Dec 2021 14:17)  HR: 63 (22 Dec 2021 14:22) (57 - 70)  BP: 127/76 (22 Dec 2021 14:22) (115/60 - 135/66)  BP(mean): 93 (22 Dec 2021 14:22) (93 - 95)  RR: 22 (22 Dec 2021 14:22) (16 - 22)  SpO2: 90% (22 Dec 2021 15:40) (71% - 97%)   (if applicable)    Chest Tube (if applicable)    HEENT: Head is normocephalic and atraumatic. Extraocular muscles are intact. Mucous membranes are moist.     NECK: Supple, no palpable adenopathy.    LUNGS: Clear to auscultation, no wheezing, rales, or rhonchi.    HEART: Regular rate and rhythm without murmur.    ABDOMEN: Soft, nontender, and nondistended.  No hepatosplenomegaly is noted.    : No painful voiding, no pelvic pain    EXTREMITIES: Without any cyanosis, clubbing, rash, lesions or edema.    NEUROLOGIC: Awake, alert, oriented, grossly intact    SKIN: Warm, dry, good turgor.      LABS:                        12.0   8.49  )-----------( 279      ( 22 Dec 2021 07:17 )             35.3     12-22    141  |  108  |  22<H>  ----------------------------<  106<H>  4.2   |  27  |  0.78    Ca    8.8      22 Dec 2021 07:17    TPro  5.8<L>  /  Alb  2.0<L>  /  TBili  0.6  /  DBili  x   /  AST  46<H>  /  ALT  59  /  AlkPhos  60  12-22                    Procalcitonin, Serum: 0.10 ng/mL (12-20-21 @ 09:36)      MICROBIOLOGY: (if applicable)    RADIOLOGY & ADDITIONAL STUDIES:  EKG:   CXR:  ECHO:    IMPRESSION: 76y Female PAST MEDICAL & SURGICAL HISTORY:  No pertinent past medical history    HTN (hypertension)    HLD (hyperlipidemia)    No significant past surgical history     p/w           Impression; 75 Y/O Female Unvaccinated for Covid 19. Admitted for Acute Hypoxic Respiratory failure secondary to Covid 19 infection with B/L Pneumonia.      Suggestion :  - at is requiring less FiO2 , continue to taper   - Isolation : contact and airborne.   - Monitor biomarkers TIW .  - s/p Remdesivir daily x 5 days .  - Decadron 6 mg /day x 10 doses then taper  . may change to PO upon discharge   - Monitor blood sugar with coverage .  - DVT/ GI prophylactic.   - Out pat pulmo f/u   - Incentive spirometry

## 2021-12-22 NOTE — PHYSICAL THERAPY INITIAL EVALUATION ADULT - MODALITIES TREATMENT COMMENTS
Oxygen saturation while on 2L/min via NC was at mid to low 90s while at rest. Patient was noted to desaturate further to 87% at rest if breathing on room air. She required 1-2 minutes rest while performing deep diaphragmatic breathing, pursed-lip breathing, segmental breathing, and chest mobility/chest expansion exercises while on O2@2L/min via NC to recover to baseline SpO2 values.6-minute walk test was attempted but terminated after only finishing close to 2 minutes of walking of up to 30 feet distance due to complaints of gradual weakness and heaviness on both LE while walking; Oxygen saturation was taken and noted to be at 85% while still on O2@2L/min via NC.

## 2021-12-22 NOTE — DISCHARGE NOTE NURSING/CASE MANAGEMENT/SOCIAL WORK - NSDCFUADDAPPT_GEN_ALL_CORE_FT
LIZY GUERRA  Internal Medicine  [address] [facility] [address] [address]  Phone: [phone]  Fax: [phone]  Follow Up Time: 2 weeks  Soft and Bite-Sized Diet  No restrictions

## 2021-12-22 NOTE — PHYSICAL THERAPY INITIAL EVALUATION ADULT - GAIT DISTANCE, PT EVAL
6-minute walk test attempted but not completed due to gradual complaint of heaviness on both LE - noted oxygen saturation to be at 85% even while on O2@2L/min via NC

## 2021-12-22 NOTE — PROGRESS NOTE ADULT - PROBLEM SELECTOR PLAN 3
-mental status at baseline   -cont home dose of Namenda  and Aricept   -cont Zyprexa at bedtime  -safety precautions

## 2021-12-22 NOTE — CHART NOTE - NSCHARTNOTEFT_GEN_A_CORE
Pt's O2 sat on room air with ambulation is 81% and O2 sat on 3L NC with ambulation is 92%, pt will require home oxygen Pt's O2 sat on room air at rest is 90%   Pt's O2 sat on room air with ambulation is 81% and O2 sat on 3L NC with ambulation is 92%, pt will require home oxygen

## 2021-12-22 NOTE — PHYSICAL THERAPY INITIAL EVALUATION ADULT - DIAGNOSIS, PT EVAL
COVID-19 infection; mild weakness and deconditioning; impaired cardiopulmonary and functional status; shortness of breath and oxygen desaturation with activity

## 2021-12-22 NOTE — DISCHARGE NOTE NURSING/CASE MANAGEMENT/SOCIAL WORK - PATIENT PORTAL LINK FT
You can access the FollowMyHealth Patient Portal offered by [facility] by registering at the following website: [email or web address]. By joining [facility]’s FollowMyHealth portal, you will also be able to view your health information using other applications (apps) compatible with our system.

## 2021-12-22 NOTE — DISCHARGE NOTE NURSING/CASE MANAGEMENT/SOCIAL WORK - NSDCPEFALRISK_GEN_ALL_CORE
For information on Fall & Injury Prevention, visit: [email or web address] OR  [email or web address] OR  https://www.cdc.gov/steadi/patient.html

## 2021-12-22 NOTE — PHYSICAL THERAPY INITIAL EVALUATION ADULT - PATIENT/FAMILY/SIGNIFICANT OTHER GOALS STATEMENT, PT EVAL
return home; ambulate, transfer, and perform ADLs independently and without difficulty, without any incidence of shortness of breath.

## 2021-12-22 NOTE — PHYSICAL THERAPY INITIAL EVALUATION ADULT - PRECAUTIONS/LIMITATIONS, REHAB EVAL
currently on O2@2L/min via NC; need to maintain SpO2 >92% at rest or during activity/no known precautions/limitations/oxygen therapy device and L/min

## 2021-12-22 NOTE — PROGRESS NOTE ADULT - ATTENDING COMMENTS
c.o headache, sob stable, was on pendant on my exam. o/n events reviewed- fu Pulm recs  c/w Dexamethasone and Remdesivir  supplemental oxygen   cw lovenox
events noted. cw. headache, per patient breathing improving. patient still requiring 5 litre NC, cw dexa and Remdesivir, cw full dose lovenox, fu pulm recs
Patient with hypoxic respiratory failure 2/2 COVID PNA. Oxygen being weaned down to 2L. Dispo pending home oxygen. Will need xarelto for 30 days on discharge
Patient admitted for hypoxic respiratory failure 2/2 COVID PNA.  Weaned down from 5L NC to 2L NC today  Will get ambulatory saturation today and see if patient needs home oxygen  Continue remdesvir and dexa  lovenox BID

## 2021-12-22 NOTE — PROGRESS NOTE ADULT - ASSESSMENT
75 y/o F from home with hx of HTN, HLD, and Dementia who presented to the ED with complaints of weakness and hypoxia. Found COVID +, CXr confirmed significant Extensive bilateral alveolar infiltrates. Admitted to medicine for acute hypoxic respiratory failure due to COVID PNA. Requiring supplemental 02, started on remdesivir and decadron. Pulm Dr. Christianson following.   Hospital course complicated by worsening dementia and agitation, started on Zyprexa at bedtime  Supplemental O2 titrated down to 2L, pt desating with ambulation, will require home O2

## 2021-12-22 NOTE — PROGRESS NOTE ADULT - COVID-19 NEGATIVE LAB RESULT
COVID-19 ruled in despite negative lab finding

## 2021-12-22 NOTE — PROGRESS NOTE ADULT - PROBLEM SELECTOR PLAN 1
-SOB and hypoxia due to bilateral pneumonia in setting of COVID 19  -completed Remdesivir  -cont Dexamethasone   -cont  with NC oxygen and monitor pulse oximetry frequently, titrate O2 down as tolerated   -obtain daily room air O2 saturations once O2 requirements stabilizes   -prone patient as tolerated   -cont prophylactic Lovenox   -cont supportive care   -trend inflammatory markers   -maintain on airborne and contact isolation   -consider need for extended prophylaxis prior to discharge based on D-Dimer and calculated VTE risk

## 2021-12-22 NOTE — PROGRESS NOTE ADULT - PROBLEM SELECTOR PLAN 8
-discharge disposition likely home, pending PT eval and home O2 set up  -CM following   -f/u PT recs

## 2021-12-23 VITALS
TEMPERATURE: 98 F | DIASTOLIC BLOOD PRESSURE: 65 MMHG | HEART RATE: 67 BPM | OXYGEN SATURATION: 96 % | RESPIRATION RATE: 18 BRPM | SYSTOLIC BLOOD PRESSURE: 131 MMHG

## 2021-12-23 LAB
ALBUMIN SERPL ELPH-MCNC: 2.2 G/DL — LOW (ref 3.5–5)
ALP SERPL-CCNC: 63 U/L — SIGNIFICANT CHANGE UP (ref 40–120)
ALT FLD-CCNC: 50 U/L DA — SIGNIFICANT CHANGE UP (ref 10–60)
ANION GAP SERPL CALC-SCNC: 8 MMOL/L — SIGNIFICANT CHANGE UP (ref 5–17)
AST SERPL-CCNC: 42 U/L — HIGH (ref 10–40)
BILIRUB SERPL-MCNC: 0.8 MG/DL — SIGNIFICANT CHANGE UP (ref 0.2–1.2)
BUN SERPL-MCNC: 21 MG/DL — HIGH (ref 7–18)
CALCIUM SERPL-MCNC: 8.2 MG/DL — LOW (ref 8.4–10.5)
CHLORIDE SERPL-SCNC: 109 MMOL/L — HIGH (ref 96–108)
CO2 SERPL-SCNC: 23 MMOL/L — SIGNIFICANT CHANGE UP (ref 22–31)
CREAT SERPL-MCNC: 0.89 MG/DL — SIGNIFICANT CHANGE UP (ref 0.5–1.3)
D DIMER BLD IA.RAPID-MCNC: 779 NG/ML DDU — HIGH
ERYTHROCYTE [SEDIMENTATION RATE] IN BLOOD: 24 MM/HR — HIGH (ref 0–20)
FERRITIN SERPL-MCNC: 1844 NG/ML — HIGH (ref 15–150)
GLUCOSE SERPL-MCNC: 196 MG/DL — HIGH (ref 70–99)
HCT VFR BLD CALC: 38.6 % — SIGNIFICANT CHANGE UP (ref 34.5–45)
HGB BLD-MCNC: 12.9 G/DL — SIGNIFICANT CHANGE UP (ref 11.5–15.5)
LDH SERPL L TO P-CCNC: 370 U/L — HIGH (ref 120–225)
MCHC RBC-ENTMCNC: 29.1 PG — SIGNIFICANT CHANGE UP (ref 27–34)
MCHC RBC-ENTMCNC: 33.4 GM/DL — SIGNIFICANT CHANGE UP (ref 32–36)
MCV RBC AUTO: 87.1 FL — SIGNIFICANT CHANGE UP (ref 80–100)
NRBC # BLD: 0 /100 WBCS — SIGNIFICANT CHANGE UP (ref 0–0)
PLATELET # BLD AUTO: 308 K/UL — SIGNIFICANT CHANGE UP (ref 150–400)
POTASSIUM SERPL-MCNC: 3.8 MMOL/L — SIGNIFICANT CHANGE UP (ref 3.5–5.3)
POTASSIUM SERPL-SCNC: 3.8 MMOL/L — SIGNIFICANT CHANGE UP (ref 3.5–5.3)
PROT SERPL-MCNC: 6.1 G/DL — SIGNIFICANT CHANGE UP (ref 6–8.3)
RBC # BLD: 4.43 M/UL — SIGNIFICANT CHANGE UP (ref 3.8–5.2)
RBC # FLD: 14.9 % — HIGH (ref 10.3–14.5)
SODIUM SERPL-SCNC: 140 MMOL/L — SIGNIFICANT CHANGE UP (ref 135–145)
WBC # BLD: 13.27 K/UL — HIGH (ref 3.8–10.5)
WBC # FLD AUTO: 13.27 K/UL — HIGH (ref 3.8–10.5)

## 2021-12-23 PROCEDURE — 99285 EMERGENCY DEPT VISIT HI MDM: CPT

## 2021-12-23 PROCEDURE — 86140 C-REACTIVE PROTEIN: CPT

## 2021-12-23 PROCEDURE — 83880 ASSAY OF NATRIURETIC PEPTIDE: CPT

## 2021-12-23 PROCEDURE — 84100 ASSAY OF PHOSPHORUS: CPT

## 2021-12-23 PROCEDURE — 36415 COLL VENOUS BLD VENIPUNCTURE: CPT

## 2021-12-23 PROCEDURE — 86769 SARS-COV-2 COVID-19 ANTIBODY: CPT

## 2021-12-23 PROCEDURE — 71045 X-RAY EXAM CHEST 1 VIEW: CPT

## 2021-12-23 PROCEDURE — 96375 TX/PRO/DX INJ NEW DRUG ADDON: CPT

## 2021-12-23 PROCEDURE — 82550 ASSAY OF CK (CPK): CPT

## 2021-12-23 PROCEDURE — 82248 BILIRUBIN DIRECT: CPT

## 2021-12-23 PROCEDURE — 85379 FIBRIN DEGRADATION QUANT: CPT

## 2021-12-23 PROCEDURE — 83605 ASSAY OF LACTIC ACID: CPT

## 2021-12-23 PROCEDURE — 87641 MR-STAPH DNA AMP PROBE: CPT

## 2021-12-23 PROCEDURE — 80053 COMPREHEN METABOLIC PANEL: CPT

## 2021-12-23 PROCEDURE — 85027 COMPLETE CBC AUTOMATED: CPT

## 2021-12-23 PROCEDURE — 96374 THER/PROPH/DIAG INJ IV PUSH: CPT

## 2021-12-23 PROCEDURE — 84145 PROCALCITONIN (PCT): CPT

## 2021-12-23 PROCEDURE — 87635 SARS-COV-2 COVID-19 AMP PRB: CPT

## 2021-12-23 PROCEDURE — 85025 COMPLETE CBC W/AUTO DIFF WBC: CPT

## 2021-12-23 PROCEDURE — 83735 ASSAY OF MAGNESIUM: CPT

## 2021-12-23 PROCEDURE — 99239 HOSP IP/OBS DSCHRG MGMT >30: CPT

## 2021-12-23 PROCEDURE — 85610 PROTHROMBIN TIME: CPT

## 2021-12-23 PROCEDURE — 85730 THROMBOPLASTIN TIME PARTIAL: CPT

## 2021-12-23 PROCEDURE — 93005 ELECTROCARDIOGRAM TRACING: CPT

## 2021-12-23 PROCEDURE — 83036 HEMOGLOBIN GLYCOSYLATED A1C: CPT

## 2021-12-23 PROCEDURE — 84484 ASSAY OF TROPONIN QUANT: CPT

## 2021-12-23 PROCEDURE — 85652 RBC SED RATE AUTOMATED: CPT

## 2021-12-23 PROCEDURE — 87640 STAPH A DNA AMP PROBE: CPT

## 2021-12-23 PROCEDURE — 82728 ASSAY OF FERRITIN: CPT

## 2021-12-23 PROCEDURE — 83615 LACTATE (LD) (LDH) ENZYME: CPT

## 2021-12-23 RX ADMIN — MEMANTINE HYDROCHLORIDE 5 MILLIGRAM(S): 10 TABLET ORAL at 13:30

## 2021-12-23 RX ADMIN — AMLODIPINE BESYLATE 2.5 MILLIGRAM(S): 2.5 TABLET ORAL at 07:01

## 2021-12-23 RX ADMIN — Medication 6 MILLIGRAM(S): at 07:05

## 2021-12-23 RX ADMIN — ENOXAPARIN SODIUM 60 MILLIGRAM(S): 100 INJECTION SUBCUTANEOUS at 07:01

## 2021-12-23 RX ADMIN — Medication 50 MILLIGRAM(S): at 13:29

## 2021-12-23 RX ADMIN — Medication 81 MILLIGRAM(S): at 13:29

## 2021-12-23 RX ADMIN — Medication 12.5 MILLIGRAM(S): at 07:02

## 2021-12-23 RX ADMIN — PANTOPRAZOLE SODIUM 40 MILLIGRAM(S): 20 TABLET, DELAYED RELEASE ORAL at 07:03

## 2021-12-23 NOTE — PROGRESS NOTE ADULT - PROVIDER SPECIALTY LIST ADULT
Pulmonology
Hospitalist
Internal Medicine

## 2021-12-23 NOTE — DIETITIAN INITIAL EVALUATION ADULT. - OTHER INFO
Pt is on Airborne isolation. Pt w COVID +. Observed Pt via Glass [facility]. Attempted to call Pt via Room ext but bulmaro Reply. D/W Son Karolinar @ [phone]. Per Son This is the second time she got sick. Per son Pt appetite decreased appetite x ~ 2 months. + weight loss. Unable to quantify. NKFA. Labs Noted . Per Nsg flow sheet Po intake ~50-75 % of meal Per Nsg flow sheet. Ht 65 inches.

## 2021-12-23 NOTE — PROGRESS NOTE ADULT - SUBJECTIVE AND OBJECTIVE BOX
Time of Visit:  Patient seen and examined.     MEDICATIONS  (STANDING):  amLODIPine   Tablet 2.5 milliGRAM(s) Oral daily  aspirin  chewable 81 milliGRAM(s) Oral daily  dexAMETHasone  Injectable 6 milliGRAM(s) IV Push daily  donepezil 5 milliGRAM(s) Oral at bedtime  enoxaparin Injectable 60 milliGRAM(s) SubCutaneous every 12 hours  influenza  Vaccine (HIGH DOSE) 0.7 milliLiter(s) IntraMuscular once  memantine 5 milliGRAM(s) Oral daily  metoprolol tartrate 12.5 milliGRAM(s) Oral two times a day  OLANZapine 2.5 milliGRAM(s) Oral at bedtime  pantoprazole    Tablet 40 milliGRAM(s) Oral before breakfast  sodium chloride 0.9%. 1000 milliLiter(s) (75 mL/Hr) IV Continuous <Continuous>  traZODone 50 milliGRAM(s) Oral daily      MEDICATIONS  (PRN):  acetaminophen     Tablet .. 650 milliGRAM(s) Oral every 6 hours PRN Temp greater or equal to 38C (100.4F), Mild Pain (1 - 3), Moderate Pain (4 - 6)  aluminum hydroxide/magnesium hydroxide/simethicone Suspension 30 milliLiter(s) Oral every 4 hours PRN Dyspepsia  benzonatate 100 milliGRAM(s) Oral three times a day PRN Cough  guaiFENesin Oral Liquid (Sugar-Free) 200 milliGRAM(s) Oral every 6 hours PRN Cough  ondansetron Injectable 4 milliGRAM(s) IV Push every 6 hours PRN Nausea and/or Vomiting       Medications up to date at time of exam.    ROS; No fever, chills, nasal congestion , cough on exam.   PHYSICAL EXAMINATION:  Vital Signs Last 24 Hrs  T(C): 36 (23 Dec 2021 05:28), Max: 36.7 (22 Dec 2021 14:17)  T(F): 96.8 (23 Dec 2021 05:28), Max: 98 (22 Dec 2021 14:17)  HR: 62 (23 Dec 2021 05:28) (60 - 74)  BP: 124/70 (23 Dec 2021 05:28) (115/60 - 127/76)  BP(mean): 93 (22 Dec 2021 14:22) (93 - 93)  RR: 19 (23 Dec 2021 05:28) (18 - 22)  SpO2: 96% (23 Dec 2021 05:28) (90% - 96%)   (if applicable)    General: Alert and oriented. Able to answer question with no SOB. No acute distress.     HEENT: Head is normocephalic and atraumatic. No nasal tenderness .  Extraocular muscles are intact. Mucous membranes are moist.     NECK: Supple, no palpable adenopathy.    LUNGS: Improving air entrance . Non labored. No wheezing. No use of accessory muscle  .    HEART: S1 S2 Regular rate and no click/ rub.     ABDOMEN: Soft, nontender, and nondistended.  No hepatosplenomegaly is noted. Active bowel sounds.     EXTREMITIES: Without any cyanosis, clubbing, rash, lesions or edema.    NEUROLOGIC: Awake, alert, oriented.     SKIN: Warm, dry, good turgor.      LABS:                        12.9   13.27 )-----------( 308      ( 23 Dec 2021 08:32 )             38.6     12-23    140  |  109<H>  |  21<H>  ----------------------------<  196<H>  3.8   |  23  |  0.89    Ca    8.2<L>      23 Dec 2021 08:32    TPro  6.1  /  Alb  2.2<L>  /  TBili  0.8  /  DBili  x   /  AST  42<H>  /  ALT  50  /  AlkPhos  63  12-23              D-Dimer Assay, Quantitative: 779 ng/mL DDU (12-23-21 @ 08:32)            MICROBIOLOGY: (if applicable)    RADIOLOGY & ADDITIONAL STUDIES:  EKG:   CXR:  ECHO:    IMPRESSION: 76y Female PAST MEDICAL & SURGICAL HISTORY:  No pertinent past medical history    HTN (hypertension)    HLD (hyperlipidemia)    No significant past surgical history    Impression; 77 Y/O Female Unvaccinated for Covid 19. Admitted for Acute Hypoxic Respiratory failure secondary to Covid 19 infection with B/L Pneumonia.      Suggestion :  O2 saturation 92% room air, with O2 supplement with O2 saturation 96%. Awaiting for Oxygen supplementation delivery at Home .    S/p Remdesivir daily x 5 days .  Decadron 6 mg /day x 10 doses then taper  . Change to PO upon discharge   Monitor blood sugar with coverage .  DVT/ GI prophylactic.   Out patient  pulmonary ff/up.    Incentive spirometry    Time of Visit:  Patient seen and examined.     MEDICATIONS  (STANDING):  amLODIPine   Tablet 2.5 milliGRAM(s) Oral daily  aspirin  chewable 81 milliGRAM(s) Oral daily  dexAMETHasone  Injectable 6 milliGRAM(s) IV Push daily  donepezil 5 milliGRAM(s) Oral at bedtime  enoxaparin Injectable 60 milliGRAM(s) SubCutaneous every 12 hours  influenza  Vaccine (HIGH DOSE) 0.7 milliLiter(s) IntraMuscular once  memantine 5 milliGRAM(s) Oral daily  metoprolol tartrate 12.5 milliGRAM(s) Oral two times a day  OLANZapine 2.5 milliGRAM(s) Oral at bedtime  pantoprazole    Tablet 40 milliGRAM(s) Oral before breakfast  sodium chloride 0.9%. 1000 milliLiter(s) (75 mL/Hr) IV Continuous <Continuous>  traZODone 50 milliGRAM(s) Oral daily      MEDICATIONS  (PRN):  acetaminophen     Tablet .. 650 milliGRAM(s) Oral every 6 hours PRN Temp greater or equal to 38C (100.4F), Mild Pain (1 - 3), Moderate Pain (4 - 6)  aluminum hydroxide/magnesium hydroxide/simethicone Suspension 30 milliLiter(s) Oral every 4 hours PRN Dyspepsia  benzonatate 100 milliGRAM(s) Oral three times a day PRN Cough  guaiFENesin Oral Liquid (Sugar-Free) 200 milliGRAM(s) Oral every 6 hours PRN Cough  ondansetron Injectable 4 milliGRAM(s) IV Push every 6 hours PRN Nausea and/or Vomiting       Medications up to date at time of exam.    ROS; No fever, chills, nasal congestion , cough on exam.   PHYSICAL EXAMINATION:  Vital Signs Last 24 Hrs  T(C): 36 (23 Dec 2021 05:28), Max: 36.7 (22 Dec 2021 14:17)  T(F): 96.8 (23 Dec 2021 05:28), Max: 98 (22 Dec 2021 14:17)  HR: 62 (23 Dec 2021 05:28) (60 - 74)  BP: 124/70 (23 Dec 2021 05:28) (115/60 - 127/76)  BP(mean): 93 (22 Dec 2021 14:22) (93 - 93)  RR: 19 (23 Dec 2021 05:28) (18 - 22)  SpO2: 96% (23 Dec 2021 05:28) (90% - 96%)   (if applicable)    General: Alert and oriented. Able to answer question with no SOB. No acute distress.     HEENT: Head is normocephalic and atraumatic. No nasal tenderness .  Extraocular muscles are intact. Mucous membranes are moist.     NECK: Supple, no palpable adenopathy.    LUNGS: Improving air entrance . Non labored. No wheezing. No use of accessory muscle  .    HEART: S1 S2 Regular rate and no click/ rub.     ABDOMEN: Soft, nontender, and nondistended.  No hepatosplenomegaly is noted. Active bowel sounds.     EXTREMITIES: Without any cyanosis, clubbing, rash, lesions or edema.    NEUROLOGIC: Awake, alert, oriented.     SKIN: Warm, dry, good turgor.      LABS:                        12.9   13.27 )-----------( 308      ( 23 Dec 2021 08:32 )             38.6     12-23    140  |  109<H>  |  21<H>  ----------------------------<  196<H>  3.8   |  23  |  0.89    Ca    8.2<L>      23 Dec 2021 08:32    TPro  6.1  /  Alb  2.2<L>  /  TBili  0.8  /  DBili  x   /  AST  42<H>  /  ALT  50  /  AlkPhos  63  12-23              D-Dimer Assay, Quantitative: 779 ng/mL DDU (12-23-21 @ 08:32)            MICROBIOLOGY: (if applicable)    RADIOLOGY & ADDITIONAL STUDIES:  EKG:   CXR:  ECHO:    IMPRESSION: 76y Female PAST MEDICAL & SURGICAL HISTORY:  No pertinent past medical history    HTN (hypertension)    HLD (hyperlipidemia)    No significant past surgical history    Impression; 77 Y/O Female Unvaccinated for Covid 19. Admitted for Acute Hypoxic Respiratory failure secondary to Covid 19 infection with B/L Pneumonia.      Suggestion :  O2 saturation 92% room air, with O2 supplement with O2 saturation 96%. Awaiting for Oxygen supplementation delivery at Home .    S/p Remdesivir daily x 5 days .  Decadron 6 mg /day x 10 doses then taper  . Change to PO upon discharge   Monitor blood sugar with coverage .  DVT/ GI prophylactic.   Out patient  pulmonary ff/up.    Incentive spirometry     Agree with above assessment and plan as transcribed.

## 2021-12-23 NOTE — DIETITIAN INITIAL EVALUATION ADULT. - PERTINENT LABORATORY DATA
12-23 Na140 mmol/L Glu 196 mg/dL<H> K+ 3.8 mmol/L Cr  0.89 mg/dL BUN 21 mg/dL<H>   12-20 Phos 2.8 mg/dL   12-23 Alb 2.2 g/dL<L>        12-17-21 @ 09:19 HgbA1C 5.8 [4.0 - 5.6]

## 2021-12-23 NOTE — DIETITIAN INITIAL EVALUATION ADULT. - PERTINENT MEDS FT
MEDICATIONS:  acetaminophen     Tablet .. 650 every 6 hours PRN  aluminum hydroxide/magnesium hydroxide/simethicone Suspension 30 every 4 hours PRN  amLODIPine   Tablet 2.5 daily  aspirin  chewable 81 daily  benzonatate 100 three times a day PRN  dexAMETHasone  Injectable 6 daily  donepezil 5 at bedtime  enoxaparin Injectable 60 every 12 hours  guaiFENesin Oral Liquid (Sugar-Free) 200 every 6 hours PRN  influenza  Vaccine (HIGH DOSE) 0.7 once  memantine 5 daily  metoprolol tartrate 12.5 two times a day  OLANZapine 2.5 at bedtime  ondansetron Injectable 4 every 6 hours PRN  pantoprazole    Tablet 40 before breakfast  sodium chloride 0.9%. 1000 <Continuous>  traZODone 50 daily

## 2022-11-30 NOTE — DISCHARGE NOTE NURSING/CASE MANAGEMENT/SOCIAL WORK - FLU SEASON?
Yes... Post-Care Instructions: Post op instructions reviewed and written copy offered. Show Applicator Variable?: Yes Detail Level: Detailed Number Of Freeze-Thaw Cycles: 1 freeze-thaw cycle Application Tool (Optional): Liquid Nitrogen Sprayer Duration Of Freeze Thaw-Cycle (Seconds): 15 Render Note In Bullet Format When Appropriate: No Consent: Oral informed patient consent was obtained. Discussed treatment options and patient had all questions answered to satisfaction prior to treatment. Risks including, but not limited to: pain, infection, bleeding, scar, change of skin texture and/or color, nerve damage, blisters, allergy, and recurrence of lesion.

## 2023-06-28 NOTE — ED PROVIDER NOTE - DISCHARGE DATE
----- Message from Blake Mendez sent at 6/28/2023 12:32 PM CDT -----  Contact: pt mother  Type:  Sooner Appointment Request    Caller is requesting a sooner appointment.  Caller declined first available appointment listed below.  Caller will not accept being placed on the waitlist and is requesting a message be sent to doctor.    Name of Caller:  pt mother  When is the first available appointment?  N/a   Symptoms:  follow up  Best Call Back Number:  [phone]    Additional Information:  pt would like a appt. Please advise.        21-Apr-2021